# Patient Record
Sex: MALE | Race: WHITE
[De-identification: names, ages, dates, MRNs, and addresses within clinical notes are randomized per-mention and may not be internally consistent; named-entity substitution may affect disease eponyms.]

---

## 2017-05-10 NOTE — ED
General Adult HPI





- General


Chief complaint: Back Pain/Injury


Stated complaint: Pain and numbness foot


Time Seen by Provider: 05/10/17 18:21


Source: patient, RN notes reviewed


Mode of arrival: ambulatory


Limitations: no limitations





- History of Present Illness


Initial comments: 





Patient 56-year-old male who presents emergency room today with chief complaint 

of increased lower back pain.  Patient does admit to pain that radiates down 

the right leg.  He does admit that it's worse when he is up moving around 

better when he sitting down.  Patient does admit to a history of back pain and 

surgery several years ago.  Patient does admit that he's been trying ibuprofen 

at home with little relief of symptoms.  Patient denies any bowel or bladder 

incontinence or retention.  Denies any saddle anesthesia.  He denies any other 

complaints.  Denies any injury or trauma to the area. Patient denies any recent 

fever, chills, shortness of breath, chest pain, abdominal pain, nausea or 

vomiting, dysuria or hematuria, constipation or diarrhea, headaches or visual 

changes, or any other complaints.





- Related Data


 Previous Rx's











 Medication  Instructions  Recorded


 


Diazepam [Valium] 5 mg PO Q8HR #20 tab 09/10/15


 


Ibuprofen [Motrin] 800 mg PO Q8HR #20 tab 09/10/15


 


Cyclobenzaprine [Flexeril] 10 mg PO TID #20 tab 05/10/17


 


Ibuprofen [Motrin] 800 mg PO Q6HR PRN #30 tab 05/10/17











 Allergies











Allergy/AdvReac Type Severity Reaction Status Date / Time


 


No Known Allergies Allergy   Verified 05/10/17 18:19














Review of Systems


ROS Statement: 


Those systems with pertinent positive or pertinent negative responses have been 

documented in the HPI.





ROS Other: All systems not noted in ROS Statement are negative.





Past Medical History


Past Medical History: Coronary Artery Disease (CAD), Diabetes Mellitus, 

Myocardial Infarction (MI)


Additional Past Medical History / Comment(s): back pain, sciatic pain,


History of Any Multi-Drug Resistant Organisms: None Reported


Past Surgical History: Back Surgery, Heart Catheterization With Stent, 

Orthopedic Surgery


Past Psychological History: No Psychological Hx Reported


Smoking Status: Current every day smoker


Past Alcohol Use History: None Reported


Past Drug Use History: None Reported





General Exam





- General Exam Comments


Initial Comments: 





General:  The patient is awake and alert, in no distress, and does not appear 

acutely ill.   


Neck:  The neck is supple, there is no tenderness or JVD.  


Cardiovascular:  There is a regular rate and rhythm. No murmur, rub or gallop 

is appreciated.


Respiratory:  Lungs are clear to auscultation, respirations are non-labored, 

breath sounds are equal.  No wheezes, stridor, rales, or rhonchi.


Musculoskeletal: No appearance of the thoracic, lumbar spine.  No step-offs 

forms appreciated.  Sensations intact.  Pulses equal bilaterally 2+.  Strength 5

/5.  Tender over the right SI joint.


Neurological:  A&O x 3. CN II-XII intact, There are no obvious motor or sensory 

deficits. Coordination appears grossly intact. Speech is normal.


Skin:  Skin is warm and dry and no rashes or lesions are noted. 


Psychiatric:  Normal mood and affect.  


Limitations: no limitations





Course





 Vital Signs











  05/10/17





  18:16


 


Temperature 98.1 F


 


Pulse Rate 71


 


Respiratory 18





Rate 


 


Blood Pressure 137/69


 


O2 Sat by Pulse 98





Oximetry 














Medical Decision Making





- Medical Decision Making





Sutures were discussed with patient about x-rays here in emergency room.  At 

this time he has declined.  He states he will follow-up with his family doctor 

for further evaluation and MRI.  Patient is diabetic will hold steroids.  

Patient advised to continue with anti-inflammatories and to use Tylenol as well 

for the pain.  Patient will also be given muscle relaxant has been made aware 

that it may make him drowsy not to take this while driving or working. 





Disposition


Clinical Impression: 


 Lumbar radiculopathy, acute





Disposition: HOME SELF-CARE


Condition: Good


Instructions:  Acute Low Back Pain (ED)


Additional Instructions: 


Please use medication as discussed.  Please be aware that medication may make 

you drowsy.  Please follow-up with family doctor in the next 2 days of symptoms 

have not improved.  Please return to emergency room if the symptoms increase or 

worsen or for any other concerns.


Prescriptions: 


Cyclobenzaprine [Flexeril] 10 mg PO TID #20 tab


Ibuprofen [Motrin] 800 mg PO Q6HR PRN #30 tab


 PRN Reason: Pain


Time of Disposition: 18:33

## 2017-07-18 NOTE — MR
EXAMINATION TYPE: MR lumbar spine wo/w con

 

DATE OF EXAM: 7/18/2017

 

COMPARISON: CT abdomen and pelvis June 17, 2012.

 

HISTORY: Low back pain and radiculopathy per order. Back pain extending down left leg with burning se
nsation and foot for 3 months per patient.

 

TECHNIQUE: 

Multiplanar, multisequence images of the lumbar spine is performed without and with IV contrast, util
izing 15 mL intravenous MultiHance 

 

FINDINGS: Sagittal images of the lumbar spine show vertebral body heights and alignment to appear sat
isfactory. Multilevel disc desiccation is present. There is redemonstration of advanced disc space na
rrowing with mild to moderate anterior spurring at L5-S1 level. There is heterogeneous increased T1 a
nd T2 signal consistent with Modic type II degenerative change anteriorly at this level. Increased si
gnal posteriorly consistent with annular tear at this level is identified. Posterior disc herniation 
L2-L3 level is seen on sagittal images. The conus medullaris is normal in position and signal ending 
at T12-L1 disc space level. There is additional endplate change anterior superior L2 endplate. There 
is additional mild to moderate multilevel anterior spurring. No suspicious postcontrast enhancement i
s seen.

 

Axial images the T12-L1 level to appear within normal limits.

 

Axial images at L1-L2 level show mild broad disc bulge effacing anterior thecal sac. Bilateral neural
 foramina remain patent.

 

Axial images at L2-L3 level show mild to moderate broad disc bulge with slightly more prominent lobul
ated right paracentral component on axial image 18, there is effacement of the anterior thecal sac. B
ilateral neural foramina remain patent.

 

Axial images at L3-L4 level show broad-based posterior disc protrusion effacing anterior thecal sac a
nd causing mild-to-moderate bilateral anterior inferior neural foraminal narrowing.

 

Axial images at L4-L5 level show mild to moderate facet degenerative changes bilaterally. There is br
oad-based posterior disc protrusion mildly effacing anterior thecal sac. There is mild to moderate le
ft and mild right-sided neural foraminal narrowing at this level identified.

 

Axial images at L5-S1 level show mild to moderate facet degenerative changes bilaterally. There is br
oad-based right paracentral disc protrusion. Spinal canal is preserved. Bilateral neural foramina are
 felt patent.

 

There are dependent gallstones in gallbladder redemonstrated. There is stable 1.6 cm posterior limb l
eft adrenal mass on axial image 26. 

 

There is nonspecific enhancement of left L3 nerve root in the spinal canal.

 

IMPRESSION: Multilevel degenerative changes in the lumbar spine as detailed above.

## 2019-10-12 ENCOUNTER — HOSPITAL ENCOUNTER (INPATIENT)
Dept: HOSPITAL 47 - EC | Age: 59
LOS: 5 days | Discharge: HOME | DRG: 246 | End: 2019-10-17
Attending: HOSPITALIST | Admitting: HOSPITALIST
Payer: COMMERCIAL

## 2019-10-12 VITALS — BODY MASS INDEX: 25.1 KG/M2

## 2019-10-12 DIAGNOSIS — Z79.4: ICD-10-CM

## 2019-10-12 DIAGNOSIS — G47.00: ICD-10-CM

## 2019-10-12 DIAGNOSIS — I25.2: ICD-10-CM

## 2019-10-12 DIAGNOSIS — M54.9: ICD-10-CM

## 2019-10-12 DIAGNOSIS — I21.19: Primary | ICD-10-CM

## 2019-10-12 DIAGNOSIS — E11.65: ICD-10-CM

## 2019-10-12 DIAGNOSIS — G89.29: ICD-10-CM

## 2019-10-12 DIAGNOSIS — E78.5: ICD-10-CM
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
## 2019-10-12 DIAGNOSIS — Z95.5: ICD-10-CM

## 2019-10-12 DIAGNOSIS — Z79.02: ICD-10-CM

## 2019-10-12 DIAGNOSIS — I25.10: ICD-10-CM

## 2019-10-12 DIAGNOSIS — Z79.82: ICD-10-CM

## 2019-10-12 DIAGNOSIS — F17.200: ICD-10-CM

## 2019-10-12 DIAGNOSIS — Z79.899: ICD-10-CM

## 2019-10-12 DIAGNOSIS — I10: ICD-10-CM

## 2019-10-12 DIAGNOSIS — I25.5: ICD-10-CM

## 2019-10-12 PROCEDURE — 83735 ASSAY OF MAGNESIUM: CPT

## 2019-10-12 PROCEDURE — 84478 ASSAY OF TRIGLYCERIDES: CPT

## 2019-10-12 PROCEDURE — 71275 CT ANGIOGRAPHY CHEST: CPT

## 2019-10-12 PROCEDURE — 85730 THROMBOPLASTIN TIME PARTIAL: CPT

## 2019-10-12 PROCEDURE — 85610 PROTHROMBIN TIME: CPT

## 2019-10-12 PROCEDURE — 84484 ASSAY OF TROPONIN QUANT: CPT

## 2019-10-12 PROCEDURE — 85025 COMPLETE CBC W/AUTO DIFF WBC: CPT

## 2019-10-12 PROCEDURE — 96374 THER/PROPH/DIAG INJ IV PUSH: CPT

## 2019-10-12 PROCEDURE — 85347 COAGULATION TIME ACTIVATED: CPT

## 2019-10-12 PROCEDURE — 83718 ASSAY OF LIPOPROTEIN: CPT

## 2019-10-12 PROCEDURE — 93005 ELECTROCARDIOGRAM TRACING: CPT

## 2019-10-12 PROCEDURE — 82465 ASSAY BLD/SERUM CHOLESTEROL: CPT

## 2019-10-12 PROCEDURE — 83721 ASSAY OF BLOOD LIPOPROTEIN: CPT

## 2019-10-12 PROCEDURE — 83036 HEMOGLOBIN GLYCOSYLATED A1C: CPT

## 2019-10-12 PROCEDURE — 80053 COMPREHEN METABOLIC PANEL: CPT

## 2019-10-12 PROCEDURE — 71045 X-RAY EXAM CHEST 1 VIEW: CPT

## 2019-10-12 PROCEDURE — 99291 CRITICAL CARE FIRST HOUR: CPT

## 2019-10-12 PROCEDURE — 36415 COLL VENOUS BLD VENIPUNCTURE: CPT

## 2019-10-12 PROCEDURE — 93458 L HRT ARTERY/VENTRICLE ANGIO: CPT

## 2019-10-12 PROCEDURE — 96376 TX/PRO/DX INJ SAME DRUG ADON: CPT

## 2019-10-12 PROCEDURE — 96365 THER/PROPH/DIAG IV INF INIT: CPT

## 2019-10-12 PROCEDURE — 80048 BASIC METABOLIC PNL TOTAL CA: CPT

## 2019-10-12 PROCEDURE — 93306 TTE W/DOPPLER COMPLETE: CPT

## 2019-10-13 LAB
ALBUMIN SERPL-MCNC: 3.9 G/DL (ref 3.5–5)
ALBUMIN SERPL-MCNC: 4.7 G/DL (ref 3.5–5)
ALP SERPL-CCNC: 105 U/L (ref 38–126)
ALP SERPL-CCNC: 131 U/L (ref 38–126)
ALT SERPL-CCNC: 42 U/L (ref 21–72)
ALT SERPL-CCNC: 62 U/L (ref 21–72)
ANION GAP SERPL CALC-SCNC: 11 MMOL/L
ANION GAP SERPL CALC-SCNC: 8 MMOL/L
APTT BLD: 23.4 SEC (ref 22–30)
AST SERPL-CCNC: 234 U/L (ref 17–59)
AST SERPL-CCNC: 39 U/L (ref 17–59)
BASOPHILS # BLD AUTO: 0 K/UL (ref 0–0.2)
BASOPHILS # BLD AUTO: 0.1 K/UL (ref 0–0.2)
BASOPHILS NFR BLD AUTO: 0 %
BASOPHILS NFR BLD AUTO: 1 %
BUN SERPL-SCNC: 21 MG/DL (ref 9–20)
BUN SERPL-SCNC: 25 MG/DL (ref 9–20)
CALCIUM SPEC-MCNC: 10.1 MG/DL (ref 8.4–10.2)
CALCIUM SPEC-MCNC: 9.4 MG/DL (ref 8.4–10.2)
CHLORIDE SERPL-SCNC: 103 MMOL/L (ref 98–107)
CHLORIDE SERPL-SCNC: 98 MMOL/L (ref 98–107)
CO2 SERPL-SCNC: 27 MMOL/L (ref 22–30)
CO2 SERPL-SCNC: 30 MMOL/L (ref 22–30)
EOSINOPHIL # BLD AUTO: 0.1 K/UL (ref 0–0.7)
EOSINOPHIL # BLD AUTO: 0.1 K/UL (ref 0–0.7)
EOSINOPHIL NFR BLD AUTO: 1 %
EOSINOPHIL NFR BLD AUTO: 1 %
ERYTHROCYTE [DISTWIDTH] IN BLOOD BY AUTOMATED COUNT: 4.62 M/UL (ref 4.3–5.9)
ERYTHROCYTE [DISTWIDTH] IN BLOOD BY AUTOMATED COUNT: 5.14 M/UL (ref 4.3–5.9)
ERYTHROCYTE [DISTWIDTH] IN BLOOD: 12.6 % (ref 11.5–15.5)
ERYTHROCYTE [DISTWIDTH] IN BLOOD: 12.7 % (ref 11.5–15.5)
GLUCOSE BLD-MCNC: 186 MG/DL (ref 75–99)
GLUCOSE BLD-MCNC: 194 MG/DL (ref 75–99)
GLUCOSE BLD-MCNC: 234 MG/DL (ref 75–99)
GLUCOSE BLD-MCNC: 260 MG/DL (ref 75–99)
GLUCOSE BLD-MCNC: 295 MG/DL (ref 75–99)
GLUCOSE SERPL-MCNC: 280 MG/DL (ref 74–99)
GLUCOSE SERPL-MCNC: 285 MG/DL (ref 74–99)
HCT VFR BLD AUTO: 42.9 % (ref 39–53)
HCT VFR BLD AUTO: 47.1 % (ref 39–53)
HGB BLD-MCNC: 14.4 GM/DL (ref 13–17.5)
HGB BLD-MCNC: 16.5 GM/DL (ref 13–17.5)
INR PPP: 0.9 (ref ?–1.2)
LYMPHOCYTES # SPEC AUTO: 3.2 K/UL (ref 1–4.8)
LYMPHOCYTES # SPEC AUTO: 4.2 K/UL (ref 1–4.8)
LYMPHOCYTES NFR SPEC AUTO: 32 %
LYMPHOCYTES NFR SPEC AUTO: 35 %
MAGNESIUM SPEC-SCNC: 2 MG/DL (ref 1.6–2.3)
MCH RBC QN AUTO: 31.3 PG (ref 25–35)
MCH RBC QN AUTO: 32 PG (ref 25–35)
MCHC RBC AUTO-ENTMCNC: 33.7 G/DL (ref 31–37)
MCHC RBC AUTO-ENTMCNC: 34.9 G/DL (ref 31–37)
MCV RBC AUTO: 91.7 FL (ref 80–100)
MCV RBC AUTO: 92.8 FL (ref 80–100)
MONOCYTES # BLD AUTO: 0.6 K/UL (ref 0–1)
MONOCYTES # BLD AUTO: 0.8 K/UL (ref 0–1)
MONOCYTES NFR BLD AUTO: 6 %
MONOCYTES NFR BLD AUTO: 7 %
NEUTROPHILS # BLD AUTO: 5.7 K/UL (ref 1.3–7.7)
NEUTROPHILS # BLD AUTO: 6.6 K/UL (ref 1.3–7.7)
NEUTROPHILS NFR BLD AUTO: 55 %
NEUTROPHILS NFR BLD AUTO: 59 %
PLATELET # BLD AUTO: 179 K/UL (ref 150–450)
PLATELET # BLD AUTO: 189 K/UL (ref 150–450)
POTASSIUM SERPL-SCNC: 4.3 MMOL/L (ref 3.5–5.1)
POTASSIUM SERPL-SCNC: 4.4 MMOL/L (ref 3.5–5.1)
PROT SERPL-MCNC: 6.8 G/DL (ref 6.3–8.2)
PROT SERPL-MCNC: 7.8 G/DL (ref 6.3–8.2)
PT BLD: 9.7 SEC (ref 9–12)
SODIUM SERPL-SCNC: 138 MMOL/L (ref 137–145)
SODIUM SERPL-SCNC: 139 MMOL/L (ref 137–145)
WBC # BLD AUTO: 12 K/UL (ref 3.8–10.6)
WBC # BLD AUTO: 9.8 K/UL (ref 3.8–10.6)

## 2019-10-13 PROCEDURE — 027236Z DILATION OF CORONARY ARTERY, THREE ARTERIES WITH THREE DRUG-ELUTING INTRALUMINAL DEVICES, PERCUTANEOUS APPROACH: ICD-10-PCS

## 2019-10-13 PROCEDURE — 02C03ZZ EXTIRPATION OF MATTER FROM CORONARY ARTERY, ONE ARTERY, PERCUTANEOUS APPROACH: ICD-10-PCS

## 2019-10-13 RX ADMIN — INSULIN ASPART SCH UNIT: 100 INJECTION, SOLUTION INTRAVENOUS; SUBCUTANEOUS at 20:41

## 2019-10-13 RX ADMIN — METOPROLOL TARTRATE SCH MG: 25 TABLET, FILM COATED ORAL at 10:37

## 2019-10-13 RX ADMIN — NITROGLYCERIN ONE MCG: 10 INJECTION INTRAVENOUS at 03:19

## 2019-10-13 RX ADMIN — INSULIN ASPART SCH UNIT: 100 INJECTION, SOLUTION INTRAVENOUS; SUBCUTANEOUS at 12:30

## 2019-10-13 RX ADMIN — LISINOPRIL SCH MG: 2.5 TABLET ORAL at 10:37

## 2019-10-13 RX ADMIN — NITROGLYCERIN ONE MCG: 10 INJECTION INTRAVENOUS at 03:35

## 2019-10-13 RX ADMIN — INSULIN ASPART SCH UNIT: 100 INJECTION, SOLUTION INTRAVENOUS; SUBCUTANEOUS at 16:50

## 2019-10-13 RX ADMIN — INSULIN ASPART SCH UNIT: 100 INJECTION, SOLUTION INTRAVENOUS; SUBCUTANEOUS at 07:31

## 2019-10-13 RX ADMIN — ASPIRIN 325 MG ORAL TABLET SCH MG: 325 PILL ORAL at 10:37

## 2019-10-13 RX ADMIN — METOPROLOL TARTRATE SCH MG: 25 TABLET, FILM COATED ORAL at 20:40

## 2019-10-13 RX ADMIN — CEFAZOLIN SCH: 330 INJECTION, POWDER, FOR SOLUTION INTRAMUSCULAR; INTRAVENOUS at 04:23

## 2019-10-13 RX ADMIN — HEPARIN SODIUM SCH MLS/HR: 10000 INJECTION, SOLUTION INTRAVENOUS at 01:28

## 2019-10-13 RX ADMIN — NITROGLYCERIN ONE MCG: 10 INJECTION INTRAVENOUS at 02:35

## 2019-10-13 RX ADMIN — ATORVASTATIN CALCIUM SCH MG: 80 TABLET, FILM COATED ORAL at 20:40

## 2019-10-13 NOTE — ED
General Adult HPI





- General


Chief complaint: Chest Pain


Stated complaint: Chest pain


Time Seen by Provider: 10/12/19 23:58


Source: EMS


Mode of arrival: EMS


Limitations: no limitations





- History of Present Illness


Initial comments: 





Dictation was produced using dragon dictation software. please excuse any 

grammatical, word or spelling errors. 





Chief Complaint: 59-year-old male presents with acute onset chest and left upper

extremity pain.





History of Present Illness: he is 59-year-old male who has past medical history 

of coronary artery disease, diabetes, myocardial infarction.  He states that he 

was in bed when he developed acute onset chest pain and left upper extremity 

pain.  Patient states that the pain is sharp without any exacerbating or 

mitigating factors.  He states that symptoms began acutely.  He does also 

complain of some numbness to his left hand.  Patient has a history of myocardial

infarction.  He does complain of chest pain.  Describes the pain in his chest as

pressure like sensation.  He does not know if his symptoms are similar to 

previous heart attack symptoms.  Patient was brought in by EMS.  EMS evaluated 

patient and was concerned of acute coronary syndrome.  He is given aspirin and 

nitroglycerin.  Patient states that his symptoms do not improve after 

nitroglycerin administration.  States that the pain is severe.  Brother was also

at bedside states that patient was observed to be acutely ill.  He did fall to 

the ground because the pain was so severe.  Patient states he feels numb to his 

left hand.  He initially felt like his pain was secondary to sleeping on his arm

incorrectly however his pain became worse.





The ROS documented in this emergency department record has been reviewed and 

confirmed by me.  Those systems with pertinent positive or negative responses 

have been documented in the HPI.  All other systems are other negative and/or 

noncontributory.








PHYSICAL EXAM:


General Impression: Alert and oriented x3, acute distress secondary to pain, 

mildly diaphoretic


HEENT: Normocephalic atraumatic, extra-ocular movements intact, pupils equal and

reactive to light bilaterally, dry mucous membranes, no carotid bruit


Cardiovascular: Heart regular rate and rhythm, S1&S2 audible, no murmurs, rubs 

or gallops


Chest: Lungs clear to auscultation bilaterally, no rhonchi, no wheeze, no rales


Abdomen: Bowel sounds present, abdomen soft, non-tender, non-distended, no 

organomegaly


Musculoskeletal: Pulses present and equal in all extremities, no peripheral 

edema.  He has equal pulses in his bilateral upper extremities.  Mild pallor and

coolness to the left hand compared to the right


Motor:  no focal deficits noted


Neurological: CN II-XII grossly intact, numbness to no blood pressure of the 

left hand compared to the right


Skin: Intact with no visualized rashes


Psych: Anxious





ED course: 59-year-old male presents with onset chest pain and left upper 

extremity pain.  Patient was in acute distress holding his left upper extremity.

 All signs upon arrival shows blood pressure 161/99, worse vital signs within 

acceptable limits.  Patient placed on cardiac monitor showing sinus rhythm.  

Given clinical presentation there is concern of acute aortic dissection 

extending into the left upper extremity.  Her bedside ultrasound was performed 

showing no pericardial effusion.  Aortic root was measured using point-of-care 

bedside ultrasound measuring at 3.8 cm.  Attempt was made to identify possible 

arterial flap in the left upper extremity arterial system.  No flap noted to the

left upper arm.  EKG did not suggest acute myocardial infarction.  Multiple 

PVCs.  Patient given 50 mg of fentanyl.


CTA of the chest with runoff into the left upper extremity shows no findings of 

acute aortic dissection with extension into the left upper extremity arterial 

system.  Labs were performed.  Patient had leukocytosis O.0.  Coag panel 

unremarkable.  Patient had a creatinine elevation of 1.48.  Patient has troponin

level is 0.3.  Repeat EKG was performed with dynamic changes.  Repeat EKG was 

consistent with inferior ST segment elevation MI with Versed with changes in the

high lateral leads.  Code STEMI was paged.  Patient was given analgesia.  

Discussed patient case with radiologist who did not see any acute findings in 

the left upper extremity.  discussed patient care with Dr. Nieto was willing to 

take patient to cardiac Cath Lab.  Patient started on heparin.  he'll be admitt

ed to Dr. Tapia's group.





EKG interpretation: Ventricular rate 98, sinus rhythm with multiple PVCs, NJ 

140, , . No NJ prolongation, no QTC prolongation, no ST or T-wave 

changes noted.  





Repeat EKG performed hour later showed inferior STEMI with reciprocal changes in

the high lateral leads.  Ventricular rate 84, when necessary 166, , QTc 

451.








- Related Data


                                  Previous Rx's











 Medication  Instructions  Recorded


 


Diazepam [Valium] 5 mg PO Q8HR #20 tab 09/10/15


 


Ibuprofen [Motrin] 800 mg PO Q8HR #20 tab 09/10/15


 


Cyclobenzaprine [Flexeril] 10 mg PO TID #20 tab 05/10/17


 


Ibuprofen [Motrin] 800 mg PO Q6HR PRN #30 tab 05/10/17











                                    Allergies











Allergy/AdvReac Type Severity Reaction Status Date / Time


 


No Known Allergies Allergy   Verified 10/13/19 00:08














Review of Systems


ROS Statement: 


Those systems with pertinent positive or pertinent negative responses have been 

documented in the HPI.





ROS Other: All systems not noted in ROS Statement are negative.





Past Medical History


Past Medical History: Coronary Artery Disease (CAD), Diabetes Mellitus, 

Myocardial Infarction (MI)


Additional Past Medical History / Comment(s): back pain, sciatic pain,


History of Any Multi-Drug Resistant Organisms: None Reported


Past Surgical History: Back Surgery, Heart Catheterization With Stent, 

Orthopedic Surgery


Past Psychological History: No Psychological Hx Reported


Smoking Status: Current every day smoker


Past Alcohol Use History: None Reported


Past Drug Use History: None Reported





General Exam


Limitations: no limitations





Course


                                   Vital Signs











  10/13/19





  00:00


 


Temperature 98.4 F


 


Pulse Rate 87


 


Respiratory 24





Rate 


 


Blood Pressure 161/99


 


O2 Sat by Pulse 98





Oximetry 














Medical Decision Making





- Lab Data


Result diagrams: 


                                 10/13/19 00:09





                                 10/13/19 00:09


                                   Lab Results











  10/13/19 10/13/19 10/13/19 Range/Units





  00:09 00:09 00:09 


 


WBC  12.0 H    (3.8-10.6)  k/uL


 


RBC  5.14    (4.30-5.90)  m/uL


 


Hgb  16.5    (13.0-17.5)  gm/dL


 


Hct  47.1    (39.0-53.0)  %


 


MCV  91.7    (80.0-100.0)  fL


 


MCH  32.0    (25.0-35.0)  pg


 


MCHC  34.9    (31.0-37.0)  g/dL


 


RDW  12.6    (11.5-15.5)  %


 


Plt Count  189    (150-450)  k/uL


 


Neutrophils %  55    %


 


Lymphocytes %  35    %


 


Monocytes %  7    %


 


Eosinophils %  1    %


 


Basophils %  1    %


 


Neutrophils #  6.6    (1.3-7.7)  k/uL


 


Lymphocytes #  4.2    (1.0-4.8)  k/uL


 


Monocytes #  0.8    (0-1.0)  k/uL


 


Eosinophils #  0.1    (0-0.7)  k/uL


 


Basophils #  0.1    (0-0.2)  k/uL


 


PT    9.7  (9.0-12.0)  sec


 


INR    0.9  (<1.2)  


 


APTT    23.4  (22.0-30.0)  sec


 


Sodium   139   (137-145)  mmol/L


 


Potassium   4.3   (3.5-5.1)  mmol/L


 


Chloride   98   ()  mmol/L


 


Carbon Dioxide   30   (22-30)  mmol/L


 


Anion Gap   11   mmol/L


 


BUN   25 H   (9-20)  mg/dL


 


Creatinine   1.48 H   (0.66-1.25)  mg/dL


 


Est GFR (CKD-EPI)AfAm   60   (>60 ml/min/1.73 sqM)  


 


Est GFR (CKD-EPI)NonAf   52   (>60 ml/min/1.73 sqM)  


 


Glucose   285 H   (74-99)  mg/dL


 


Calcium   10.1   (8.4-10.2)  mg/dL


 


Magnesium   2.0   (1.6-2.3)  mg/dL


 


Total Bilirubin   0.4   (0.2-1.3)  mg/dL


 


AST   39   (17-59)  U/L


 


ALT   42   (21-72)  U/L


 


Alkaline Phosphatase   131 H   ()  U/L


 


Troponin I     (0.000-0.034)  ng/mL


 


Total Protein   7.8   (6.3-8.2)  g/dL


 


Albumin   4.7   (3.5-5.0)  g/dL














  10/13/19 Range/Units





  00:09 


 


WBC   (3.8-10.6)  k/uL


 


RBC   (4.30-5.90)  m/uL


 


Hgb   (13.0-17.5)  gm/dL


 


Hct   (39.0-53.0)  %


 


MCV   (80.0-100.0)  fL


 


MCH   (25.0-35.0)  pg


 


MCHC   (31.0-37.0)  g/dL


 


RDW   (11.5-15.5)  %


 


Plt Count   (150-450)  k/uL


 


Neutrophils %   %


 


Lymphocytes %   %


 


Monocytes %   %


 


Eosinophils %   %


 


Basophils %   %


 


Neutrophils #   (1.3-7.7)  k/uL


 


Lymphocytes #   (1.0-4.8)  k/uL


 


Monocytes #   (0-1.0)  k/uL


 


Eosinophils #   (0-0.7)  k/uL


 


Basophils #   (0-0.2)  k/uL


 


PT   (9.0-12.0)  sec


 


INR   (<1.2)  


 


APTT   (22.0-30.0)  sec


 


Sodium   (137-145)  mmol/L


 


Potassium   (3.5-5.1)  mmol/L


 


Chloride   ()  mmol/L


 


Carbon Dioxide   (22-30)  mmol/L


 


Anion Gap   mmol/L


 


BUN   (9-20)  mg/dL


 


Creatinine   (0.66-1.25)  mg/dL


 


Est GFR (CKD-EPI)AfAm   (>60 ml/min/1.73 sqM)  


 


Est GFR (CKD-EPI)NonAf   (>60 ml/min/1.73 sqM)  


 


Glucose   (74-99)  mg/dL


 


Calcium   (8.4-10.2)  mg/dL


 


Magnesium   (1.6-2.3)  mg/dL


 


Total Bilirubin   (0.2-1.3)  mg/dL


 


AST   (17-59)  U/L


 


ALT   (21-72)  U/L


 


Alkaline Phosphatase   ()  U/L


 


Troponin I  0.300 H*  (0.000-0.034)  ng/mL


 


Total Protein   (6.3-8.2)  g/dL


 


Albumin   (3.5-5.0)  g/dL














Critical Care Time


Critical Care Time: Yes


Total Critical Care Time: 62





Disposition


Clinical Impression: 


 STEMI (ST elevation myocardial infarction)





Disposition: ADMITTED AS IP TO THIS [facility]


Condition: Critical


Referrals: 


None,Stated [Primary Care Provider] - 1-2 days


Decision Time: 01:34

## 2019-10-13 NOTE — CT
EXAMINATION TYPE: CT angio upper extremity LT

 

DATE OF EXAM: 10/13/2019 1:04 AM

 

COMPARISON:

 

HISTORY: dissection

 

CT DLP: 378.4 mGycm

Automated exposure control for dose reduction was used.

 

TECHNIQUE: 

Performed with IV Contrast, patient injected with 80 mL of Isovue 370.

. 

 

FINDINGS: 

Multiple axial sections were obtained from the level of the main pulmonary artery to the tip of the l
eft fingers with intravenous contrast. There are 3-D post processed images.

 

There is normal branching pattern of the great vessels on the aortic arch. There is arterial flow in 
the left subclavian artery which appears widely patent. There is arterial flow in the left axillary a
nd brachial artery. There is arterial flow in the radial and ulnar arteries. There is no evidence of 
hemodynamic stenosis. There is arterial flow in the radial and ulnar arteries at the wrist. There is 
limited visualization of arteries of the hand. This is probably due to the timing. There is no eviden
ce of aortic dissection. There is no evidence of arterial dissection. I see no evidence of hemodynami
c stenosis.

 

IMPRESSION: 

NEGATIVE CT ANGIOGRAM OF THE LEFT ARM. NO EVIDENCE OF ARTERIAL DISSECTION. NO EVIDENCE OF HEMODYNAMIC
 STENOSIS.

## 2019-10-13 NOTE — P.HPIM
History of Present Illness


H&P Date: 10/13/19


Chief Complaint: Chest pain with right shoulder pain


Mr. Cope is a 59-year-old male with past medical history of hypertension, 

diabetes mellitus, myocardial infarction coming into the hospital with a chief 

complaint of left-sided chest pain that has been radiating to the left shoulder.

 The patient states that he has chronic left shoulder pain that is exacerbated 

with exertion for the past 3 years.  Patient was out of the state went to 

[place] and came in last evening.  Then he started to have chest pain, left 

side of the chest 8 out of 10 in intensity radiating to his left shoulder 

associated with mild difficulty in breathing.  The patient denied having any 

dizziness.  When the patient had the chest pain his brother was at his bedside 

and thought that the patient was in severe pain and that prompted them to call 

EMS.  Patient did fall to the ground couple of times because the pain was very 

severe and he also felt that his left hand was numb.





Patient states that he has hypertension and diabetes but has not been taking any

medications for the past 2 years due to insurance issues.  Smoking history is 

positive he smokes 1 pack in 2 days.  Patient also has tingling and numbness in 

bilateral lower extremities which could be due to diabetic neuropathy.  Patient 

also reports having lower extremity swelling on and off and that is reviewed 

with elevation of his feet at night.





In the emergency department initially there was a concern for her tic dissection

so the patient had CT angios of the chest this was negative.  The EKG was 

negative but subsequent EKG was showing ST segment elevation in 203 and aVF 

leads and the code STEMI pager was activated.  So the patient was taken to the 

cath lab by Dr. Nieto.  It revealed acute total occlusion of the mid RCA and 

severe disease involving the distal RCA .  So the patient had thrombectomy and 

stenting of the mid RCA and also the distal RCA.  Later on patient has been 

transferred to the ICU.





Currently the patient is sitting up in the bed, having his dinner.  Patient 

denies having any chest pain.  Patient's vitals have been within normal limits 

and he has been started on aspirin, Effient, statin, ACE inhibitor and beta 

blocker.














Review of Systems


REVIEW OF SYSTEMS:





PSYCH: No anxiety or depression


NEURO:No c/o weakness of the extremties, No facial droop, No speech abnor

malities.


VASCULAR: Peripheral nervous system within the normal limits no edema


HEMATOLOGIC: No history of easy bleeding and bruising .  No recent infections .


RESPIRATORY: No cough, No SOB, No chest discomfort. 


IMMUNE: No infections


INTEGUMENT: no rashes


OPHTHALMOLOGIC: No blurry vision and no eye discharge


: No dysuria or hematuria


CARDIAC: As per HPI


MUSCULOSKELETAL : No Aches or pains in the joints or muscles. 


GI: No abdominal pain, Nausea or vomiting. No constipation or diarrhea. 





All 13 review systems are negative except ones mentioned above





Past Medical History


Past Medical History: Coronary Artery Disease (CAD), Diabetes Mellitus, Myocardi

al Infarction (MI)


Additional Past Medical History / Comment(s): back pain, sciatic pain,


Last Myocardial Infarction Date:: 2019


History of Any Multi-Drug Resistant Organisms: None Reported


Past Surgical History: Back Surgery, Heart Catheterization With Stent, 

Orthopedic Surgery


Additional Past Surgical History / Comment(s): Left shoulder surgery


Past Anesthesia/Blood Transfusion Reactions: No Reported Reaction


Date of Last Stent Placement:: 2014


Past Psychological History: No Psychological Hx Reported


Smoking Status: Current every day smoker


Past Alcohol Use History: None Reported


Past Drug Use History: None Reported





Medications and Allergies


                                Home Medications











 Medication  Instructions  Recorded  Confirmed  Type


 


No Known Home Medications  10/13/19 10/13/19 History








                                    Allergies











Allergy/AdvReac Type Severity Reaction Status Date / Time


 


No Known Allergies Allergy   Verified 10/13/19 08:07














Physical Exam


Vitals: 


                                   Vital Signs











  Temp Pulse Resp BP Pulse Ox


 


 10/13/19 15:00   71  27 H  128/93  97


 


 10/13/19 14:30   71  10 L  130/81  96


 


 10/13/19 14:00   67  13  117/86  98


 


 10/13/19 13:30   65  8 L  120/78  98


 


 10/13/19 13:00   61  15  114/84  99


 


 10/13/19 12:30   67  6 L  130/77  99


 


 10/13/19 12:00  97.5 F L  72  20  129/75  98


 


 10/13/19 11:30   72  19  132/71  96


 


 10/13/19 11:00   72  15  112/77  98


 


 10/13/19 10:30   73  13  123/71  98


 


 10/13/19 10:00   73  20  128/74  96


 


 10/13/19 09:30   80   118/85  99


 


 10/13/19 09:00   70  12  147/89  99


 


 10/13/19 08:30   82  10 L  133/84  100


 


 10/13/19 08:00  97.5 F L  72  12  132/75  99


 


 10/13/19 07:30   67  15  126/75  98


 


 10/13/19 07:00   70  10 L  113/69  97


 


 10/13/19 06:30   75  7 L  124/80  98


 


 10/13/19 06:00   71  6 L  138/88  98


 


 10/13/19 05:30   76  12  138/88  95


 


 10/13/19 05:00   71  11 L   98


 


 10/13/19 04:30  97.8 F  84  9 L   99


 


 10/13/19 01:50   83  18  156/92  100


 


 10/13/19 01:45   89  20  150/91  100


 


 10/13/19 01:40   86  22  138/96  100


 


 10/13/19 01:35   86  20  148/92  100


 


 10/13/19 01:30   90  20  149/102  100


 


 10/13/19 01:00   86  12  120/95  100


 


 10/13/19 00:30   92  29 H  161/99  97


 


 10/13/19 00:02    28 H  


 


 10/13/19 00:00  98.4 F  87  24  161/99  98








                                Intake and Output











 10/13/19 10/13/19 10/13/19





 06:59 14:59 22:59


 


Intake Total 832 850 500


 


Output Total 800 1400 


 


Balance 32 -550 500


 


Intake:   


 


   450 


 


    Sodium Chloride 0.9% 1, 225 450 





    000 ml @ 75 mls/hr IV .   





    [id number] [facility] Rx#:[id number]   


 


  Oral 250 400 500


 


Output:   


 


  Urine 800 1400 


 


Other:   


 


  Voiding Method Urinal Urinal 


 


  Weight 86.183 kg  86.183 kg














GEN. APPEARANCE: alert, in no apparent distress


HEAD EXAM:  atraumatic, normocephalic, normal inspection


EYE EXAM: No pallor.  No icterus


ENT EXAM:  normal exam, mucous membranes moist


NECK EXAM: No JVD.  No thyromegaly


RESPIRATORY EXAM: Decreased air entry bilaterally .  No wheezes or crackles


CARDIOVASCULAR EXAM:  regular rate, normal rhythm, normal heart sounds.  Absent:

systolic murmur, diastolic murmur, rubs, gallop, clicks


GI/ABDOMINAL EXAM: soft, normal bowel sounds.  Absent: distended, tenderness, 

guarding, rebound, rigid


EXTREMITIES EXAM:  No pedal edema


NEUROLOGICAL EXAM:  alert, oriented X3, no focal neurological deficits


PSYCHIATRIC EXAM:  normal affect, normal mood


SKIN EXAM:  warm, dry, intact, normal color.  Absent: rash








Results


CBC & Chem 7: 


                                 10/13/19 04:50





                                 10/13/19 04:50


Labs: 


                  Abnormal Lab Results - Last 24 Hours (Table)











  10/13/19 10/13/19 10/13/19 Range/Units





  00:09 00:09 00:09 


 


WBC  12.0 H    (3.8-10.6)  k/uL


 


APTT     (22.0-30.0)  sec


 


BUN   25 H   (9-20)  mg/dL


 


Creatinine   1.48 H   (0.66-1.25)  mg/dL


 


Glucose   285 H   (74-99)  mg/dL


 


POC Glucose (mg/dL)     (75-99)  mg/dL


 


AST     (17-59)  U/L


 


Alkaline Phosphatase   131 H   ()  U/L


 


Troponin I    0.300 H*  (0.000-0.034)  ng/mL














  10/13/19 10/13/19 10/13/19 Range/Units





  04:10 04:50 04:50 


 


WBC     (3.8-10.6)  k/uL


 


APTT   74.3 H   (22.0-30.0)  sec


 


BUN    21 H  (9-20)  mg/dL


 


Creatinine     (0.66-1.25)  mg/dL


 


Glucose    280 H  (74-99)  mg/dL


 


POC Glucose (mg/dL)  260 H    (75-99)  mg/dL


 


AST    234 H  (17-59)  U/L


 


Alkaline Phosphatase     ()  U/L


 


Troponin I     (0.000-0.034)  ng/mL














  10/13/19 10/13/19 Range/Units





  07:06 11:40 


 


WBC    (3.8-10.6)  k/uL


 


APTT    (22.0-30.0)  sec


 


BUN    (9-20)  mg/dL


 


Creatinine    (0.66-1.25)  mg/dL


 


Glucose    (74-99)  mg/dL


 


POC Glucose (mg/dL)  234 H  295 H  (75-99)  mg/dL


 


AST    (17-59)  U/L


 


Alkaline Phosphatase    ()  U/L


 


Troponin I    (0.000-0.034)  ng/mL














Thrombosis Risk Factor Assmnt





- Choose All That Apply


Any of the Below Risk Factors Present?: Yes


Each Factor Represents 1 point: Acute MI, Age 41-60 years


Other Risk Factors: Yes


Each Risk Factor Represents 2 Points: Patient confined to bed


Other congenital or acquired thrombophilia - If yes, enter type in comment: No


Thrombosis Risk Factor Assessment Total Risk Factor Score: 4


Thrombosis Risk Factor Assessment Level: Moderate Risk





Assessment and Plan


Assessment: 





ASSESSMENT








ST elevation MI


Type 2 diabetes mellitus poorly controlled


Hypertension poorly controlled


Chronic nicotine dependence


History of myocardial infarction








PLAN: Patient had stenting of his RCA and has been started on dual antiplatelet 

therapy, beta blocker, ACE inhibitor, statin.  As the patient's blood sugars 

have been running high he has been started on insulin 70/30 and will titrate 

depending upon the blood sugars.  Continue with the rest of his medication 

regimen.  Hemoglobin A1c and lipid panel pending currently.  Patient is 

extensively educated on smoking cessation.  Will have  on board to 

help him to get insurance issues.  Further recommendations to follow depending 

on the progress of the patient.

## 2019-10-13 NOTE — P.CRDCN
History of Present Illness


Consult date: 10/13/19


Chief complaint: Chest pain


History of present illness: 





This is a pleasant 59-year-old gentleman with a past medical history significant

for coronary artery disease and prior stenting of the RCA which was performed at

[place] several years ago, currently the patient does not follow-up with any 

cardiologist, and unfortunately continues to smoke, presented to the emergency 

room complaining of chest discomfort.  He was in his usual state of health until

yesterday evening when he was at home and started experiencing discomfort in the

mid of the chest, as a squeezing sensation, with radiation to the left arm, it 

was associated with sweating as well as dizziness.  He presented to the 

emergency room here [facility] where he initially there was a concern 

in the ER for dissection.  Because of that the CTA was performed and did not sh

ow any dissection.  A subsequent EKG was performed in the emergency room and 

that revealed acute inferior ST patient myocardial infarction.  Because of that 

the cath lab was called.  The patient underwent a heart catheterization which 

revealed acute total occlusion of the mid RCA with severe disease involving the 

distal RCA as well as occluded PLV branch of the RCA.  The lesions in the RCA 

were very complex.  I did perform aspiration thrombectomy with the extraction of

large white clot from the right coronary artery with subsequent stenting of the 

mid RCA as well as distal RCA as well as PLV branch of the RCA with a good 

angiographic results by the end and reduction of stenosis or 100% to 0%.  By the

end of the procedure, the patient was chest pain-free.  The patient is going to 

be admitted to the intensive care unit.  He will be started on dual antiplatelet

therapy along with anti-ischemic medication along with high intensity statin.  

We'll obtain an echocardiogram was Doppler.  We'll continue following up with 

him.  Also fasting lipid profile will be performed.





Past Medical History


Past Medical History: Coronary Artery Disease (CAD), Diabetes Mellitus, 

Myocardial Infarction (MI)


Additional Past Medical History / Comment(s): back pain, sciatic pain,


History of Any Multi-Drug Resistant Organisms: None Reported


Past Surgical History: Back Surgery, Heart Catheterization With Stent, 

Orthopedic Surgery


Past Psychological History: No Psychological Hx Reported


Smoking Status: Current every day smoker


Past Alcohol Use History: None Reported


Past Drug Use History: None Reported





Medications and Allergies


                                Home Medications











 Medication  Instructions  Recorded  Confirmed  Type


 


Diazepam [Valium] 5 mg PO Q8HR #20 tab 09/10/15  Rx


 


Ibuprofen [Motrin] 800 mg PO Q8HR #20 tab 09/10/15  Rx


 


Cyclobenzaprine [Flexeril] 10 mg PO TID #20 tab 05/10/17  Rx


 


Ibuprofen [Motrin] 800 mg PO Q6HR PRN #30 tab 05/10/17  Rx








                                    Allergies











Allergy/AdvReac Type Severity Reaction Status Date / Time


 


No Known Allergies Allergy   Verified 10/13/19 00:08














Physical Exam


Vitals: 


                                   Vital Signs











  Temp Pulse Resp BP Pulse Ox


 


 10/13/19 01:50   83  18  156/92  100


 


 10/13/19 01:45   89  20  150/91  100


 


 10/13/19 01:40   86  22  138/96  100


 


 10/13/19 01:35   86  20  148/92  100


 


 10/13/19 01:30   90  20  149/102  100


 


 10/13/19 00:00  98.4 F  87  24  161/99  98








                                Intake and Output











 10/12/19 10/12/19 10/13/19





 14:59 22:59 06:59


 


Intake Total   357


 


Balance   357


 


Intake:   


 


  IV   357


 


Other:   


 


  Weight   86.183 kg














- Constitutional


General appearance: no acute distress





- Respiratory


Respiratory: bilateral: CTA





- Cardiovascular


Rhythm: regular


Heart sounds: normal: S1, S2





Results





                                 10/13/19 00:09





                                 10/13/19 00:09


                                 Cardiac Enzymes











  10/13/19 10/13/19 Range/Units





  00:09 00:09 


 


AST  39   (17-59)  U/L


 


Troponin I   0.300 H*  (0.000-0.034)  ng/mL








                                   Coagulation











  10/13/19 Range/Units





  00:09 


 


PT  9.7  (9.0-12.0)  sec


 


APTT  23.4  (22.0-30.0)  sec








                                       CBC











  10/13/19 Range/Units





  00:09 


 


WBC  12.0 H  (3.8-10.6)  k/uL


 


RBC  5.14  (4.30-5.90)  m/uL


 


Hgb  16.5  (13.0-17.5)  gm/dL


 


Hct  47.1  (39.0-53.0)  %


 


Plt Count  189  (150-450)  k/uL








                          Comprehensive Metabolic Panel











  10/13/19 Range/Units





  00:09 


 


Sodium  139  (137-145)  mmol/L


 


Potassium  4.3  (3.5-5.1)  mmol/L


 


Chloride  98  ()  mmol/L


 


Carbon Dioxide  30  (22-30)  mmol/L


 


BUN  25 H  (9-20)  mg/dL


 


Creatinine  1.48 H  (0.66-1.25)  mg/dL


 


Glucose  285 H  (74-99)  mg/dL


 


Calcium  10.1  (8.4-10.2)  mg/dL


 


AST  39  (17-59)  U/L


 


ALT  42  (21-72)  U/L


 


Alkaline Phosphatase  131 H  ()  U/L


 


Total Protein  7.8  (6.3-8.2)  g/dL


 


Albumin  4.7  (3.5-5.0)  g/dL








                               Current Medications











Generic Name Dose Route Start Last Admin





  Trade Name Freq  PRN Reason Stop Dose Admin


 


Heparin Sodium (Porcine)  0 unit  10/13/19 01:19 





  Heparin  IV  





  PER PROTOCOL PRN  





  Low PTT  





  Protocol  


 


Heparin Sodium/Sodium Chloride  250 mls @ 9.48 mls/hr  10/13/19 01:30  10/13/19 

01:28





  25,000 unit/ Sodium Chloride  IV   11 units/kg/hr





  .Q24H ROGER   9.48 mls/hr





    Administration





  Protocol  





  11 UNITS/KG/HR  


 


Sodium Chloride  1,000 mls @ 20 mls/hr  10/13/19 01:45 





  Saline 0.9%  IV  





  .Q24H ROGER  


 


Naloxone HCl  0.2 mg  10/13/19 01:34 





  Narcan  IV  





  Q2M PRN  





  Opioid Reversal  








                                Intake and Output











 10/12/19 10/12/19 10/13/19





 14:59 22:59 06:59


 


Intake Total   357


 


Balance   357


 


Intake:   


 


  IV   357


 


Other:   


 


  Weight   86.183 kg








                                 Patient Weight











 10/13/19





 06:59


 


Weight 86.183 kg








                                        





                                 10/13/19 00:09 





                                 10/13/19 00:09 











Assessment and Plan


Assessment: 





Assessment


#1 acute inferior ST patient myocardial infarction


#2 coronary artery disease and prior stenting of the RCA


#3 significant history of smoking


#4 hypertension


#5 dyslipidemia





Plan


#1 the patient underwent successful stenting of the RCA in the mid and distal 

portion as well as stenting of the PLV branch of the RCA


#2 continue dual antiplatelet therapy along with high intensity statin along 

with anti-ischemic medication


#3 obtain an echocardiogram to establish LV function


#4 fasting lipid profile


#5 smoking cessation was discussed with him


#6 follow-up with the patient





Thank you for allowing us participate in his care and we will continue following

 up with the patient

## 2019-10-13 NOTE — XR
EXAMINATION TYPE: XR chest 1V portable

 

DATE OF EXAM: 10/13/2019

 

COMPARISON: 4/17/2014

 

HISTORY: Chest pain

 

TECHNIQUE: Single frontal view of the chest is obtained.

 

FINDINGS:  There is no heart failure nor confluent pneumonic infiltrate. Costophrenic angles are chris
r. There are chest leads. Bony thorax is intact.

 

IMPRESSION:  No active cardiopulmonary disease. No change.

## 2019-10-13 NOTE — CT
EXAMINATION TYPE: CT angio chest

 

DATE OF EXAM: 10/13/2019 1:04 AM

 

COMPARISON: None

 

HISTORY: r/o dissection

 

CT DLP: 606.8 mGycm

Automated exposure control for dose reduction was used.

 

CONTRAST: 

CTA scan of the thorax is performed with IV Contrast, patient injected with 80 mL of Isovue 370, pulm
onary embolism protocol. .  

 

FINDINGS:

 

There are 3-D post processed images.

 

There is no mediastinal adenopathy. Thoracic aorta is atheromatous. There is no thoracic aortic aneur
ysm or dissection. Ascending aorta measures 3.8 cm. There are no hilar masses.

 

There is normal contrast opacification of the pulmonary arteries. There are no filling defects.

 

The lungs are clear of infiltrate. There is no evidence of a pulmonary mass. There is subsegmental at
electasis at the posterior lung bases. There is no pleural effusion. There is no pericardial effusion
. Heart size is fairly normal. There is probably small calcified gallstones.

The bony thorax is intact. There is no compression fracture.

IMPRESSION: 

NO EVIDENCE OF PULMONARY EMBOLISM. MILD SUBSEGMENTAL ATELECTASIS AT THE LUNG BASES.

## 2019-10-13 NOTE — CC
CARDIAC CATHETERIZATION REPORT



DATE OF SERVICE:

October 13, 2019



PERFORMING PHYSICIAN:

Juanjose Powell MD, interventional cardiologist.



PROCEDURE PERFORMED:

1. Selective right and left coronary angiogram.

2. Aspiration thrombectomy from the right coronary artery.

3. Successful stenting of the mid RCA using 4.0 x 23 mm Xience HAI with an excellent

    angiographic results and reduction of stenosis from 100% to 0%.

4. Successful stenting of the distal right coronary artery using 3.0 x 18 mm Xience

    HAI with an excellent angiographic results and reduction of stenosis from 80% to

    0%.

5. Successful stenting of the PLV branch of the RCA with excellent angiographic

    results as well.

6. Left heart catheterization.



INDICATIONS:

This is a 59-year-old gentleman with history of coronary artery disease and prior

stenting of the RCA which was performed at [place], unfortunately, the patient is not

following with any cardiologist for the last several years, and unfortunately, he

continues to smoke, presented to the emergency room with chest discomfort.  Initially,

there was a concern about aortic dissection and because of that, he underwent a CTA of

the chest, which showed no dissection.  Subsequent EKG in the ER showed finding

concerning for acute inferior ST-elevation myocardial infarction and because of that, a

heart catheterization emergently was advised and the cath lab staff was called.



APPROACH:

Right common femoral artery.



COMPLICATION:

None.



LEVEL OF SEDATION:

Moderate with sedation length of 96 minutes.



Door to balloon was 2 hours and 26 minutes.



Please note that the patient underwent a CTA of the chest before the heart

catheterization.



PROCEDURE DESCRIPTION:

After obtaining an informed consent, the patient was brought to the cardiac cath lab.

The right common femoral artery was cannulated using micropuncture technique, the

micropuncture wire passed easily.  Then I placed a 6-Swiss sheath 11 cm in the right

common femoral artery.



At that point, I did selective right and left coronary angiogram with JR4 and JL4

catheters.  Subsequently, I did intervene on the right coronary artery.  Please see a

separate paragraph for that.  After that I did leave heart catheterization.



The procedure was completed without any complication.



SELECTIVE CORONARY ANGIOGRAM:

1. The right coronary artery is 100% occluded in the midportion where previous stent

    was placed.

2. The left main is a large caliber vessel with mild disease only.  It bifurcates into

    LCX and LAD.

3. The LCX is a medium caliber vessel.  It is a nondominant vessel.  The proximal circ

    appeared to be angiographically normal.  The mid circumflex has mild disease only.

    The distal circ appeared to be angiographically normal.

4. The LAD: The proximal LAD appeared to have mild disease only.  The mid LAD gives

    rise into a large diagonal branch which has a tight lesion appeared to be in the

    range of 80%.  After that, the LAD is subtotally occluded.

5.



PCI OF THE RCA:

Anticoagulation was initiated using Angiomax.  I crossed the acute total occlusion in

the mid right coronary artery using 0.14 Whisper wire with the backup support of 2.5 x

12 mm balloon.  After that, I did balloon angioplasty using a 2.5 mm balloon which was

placed in the mid RCA under fluoroscopy guidance and inflated under 14 atmospheres for

20 seconds.  The following angiogram showed flow in the mid RCA, but there was no flow

in the distal right coronary artery.  The clot was suspected and because of that, I did

perform aspiration thrombectomy using export catheter with extraction of significant

amount of white thrombus from the right coronary artery.  After that, I did place stent

in the mid RCA.  I stented the RCA using 4.0 x 23 mm Xience drug-eluting stent.  The

stent was positioned under fluoroscopy guidance and deployed under 18 atmospheres for

20 seconds.  I did post dilated the stent using 3.5 x 18 mm NC balloon which was

inflated under 18 atmospheres for 20 seconds.  The following angiogram showed good

angiographic results in the mid RCA.  The distal RCA just before the bifurcation into

PDA and PLV branches was found to have a very tight lesion about 80-90 percent.  I

ballooned the lesion using 3.0 mm balloon before I deployed a 3.0 x 18 mm another

Xience drug-eluting stent where the stent again was positioned under fluoroscopy

guidance and deployed under its nominal pressure.  The following angiogram showed good

angiographic results.  The PLV branch of the right coronary artery has a hazy lesion

appeared to be quite concerning.  I decided to stent that lesion.  I did wire the PLV

branch using a run-through wire.  I did do balloon angioplasty using 2.5 x 12 mm

balloon.  After that I did stent the PLV branch using 2.0 x 23 mm Bk drug-eluting

stent.  The stent was positioned under fluoroscopy guidance and deployed under 18

atmospheres for 20 seconds.  The following angiogram showed an area distal to the

stent, likely to be thrombus.  I decided to leave that alone.  The procedure was

completed without any complication.



CONCLUSION:

1. Acute inferior ST-elevation myocardial infarction in this gentleman with prior

    history of RCA stenting, who continues to smoke.

2. Successful stenting of the mid RCA, distal RCA, and the PLV branch of the RCA.

3. Subtotally occluded mid LAD.



POSTPROCEDURE MANAGEMENT:

1. Dual anti-platelet therapy.

2. Aggressive cholesterol control.

3. Risk factors modifications.

4. An echocardiogram to establish LV function.

5. I discussed with the patient the revascularization of the left anterior descending

    artery.





MMODL / IJN: [id number] / Job#: [id number]

## 2019-10-14 LAB
ANION GAP SERPL CALC-SCNC: 6 MMOL/L
BASOPHILS # BLD AUTO: 0 K/UL (ref 0–0.2)
BASOPHILS NFR BLD AUTO: 0 %
BUN SERPL-SCNC: 13 MG/DL (ref 9–20)
CALCIUM SPEC-MCNC: 9.1 MG/DL (ref 8.4–10.2)
CHLORIDE SERPL-SCNC: 105 MMOL/L (ref 98–107)
CHOLEST SERPL-MCNC: 156 MG/DL (ref ?–200)
CO2 SERPL-SCNC: 27 MMOL/L (ref 22–30)
EOSINOPHIL # BLD AUTO: 0.1 K/UL (ref 0–0.7)
EOSINOPHIL NFR BLD AUTO: 1 %
ERYTHROCYTE [DISTWIDTH] IN BLOOD BY AUTOMATED COUNT: 4.54 M/UL (ref 4.3–5.9)
ERYTHROCYTE [DISTWIDTH] IN BLOOD: 12.6 % (ref 11.5–15.5)
GLUCOSE BLD-MCNC: 142 MG/DL (ref 75–99)
GLUCOSE BLD-MCNC: 154 MG/DL (ref 75–99)
GLUCOSE BLD-MCNC: 176 MG/DL (ref 75–99)
GLUCOSE BLD-MCNC: 216 MG/DL (ref 75–99)
GLUCOSE SERPL-MCNC: 139 MG/DL (ref 74–99)
HBA1C MFR BLD: 9.4 % (ref 4–6)
HCT VFR BLD AUTO: 42 % (ref 39–53)
HDLC SERPL-MCNC: 30 MG/DL (ref 40–60)
HGB BLD-MCNC: 14.5 GM/DL (ref 13–17.5)
LYMPHOCYTES # SPEC AUTO: 3 K/UL (ref 1–4.8)
LYMPHOCYTES NFR SPEC AUTO: 30 %
MCH RBC QN AUTO: 31.9 PG (ref 25–35)
MCHC RBC AUTO-ENTMCNC: 34.4 G/DL (ref 31–37)
MCV RBC AUTO: 92.5 FL (ref 80–100)
MONOCYTES # BLD AUTO: 0.6 K/UL (ref 0–1)
MONOCYTES NFR BLD AUTO: 6 %
NEUTROPHILS # BLD AUTO: 6.1 K/UL (ref 1.3–7.7)
NEUTROPHILS NFR BLD AUTO: 61 %
PLATELET # BLD AUTO: 161 K/UL (ref 150–450)
POTASSIUM SERPL-SCNC: 4.1 MMOL/L (ref 3.5–5.1)
SODIUM SERPL-SCNC: 138 MMOL/L (ref 137–145)
WBC # BLD AUTO: 10 K/UL (ref 3.8–10.6)

## 2019-10-14 RX ADMIN — INSULIN ASPART SCH UNIT: 100 INJECTION, SOLUTION INTRAVENOUS; SUBCUTANEOUS at 11:56

## 2019-10-14 RX ADMIN — INSULIN ASPART SCH UNIT: 100 INJECTION, SUSPENSION SUBCUTANEOUS at 06:49

## 2019-10-14 RX ADMIN — LISINOPRIL SCH MG: 2.5 TABLET ORAL at 08:53

## 2019-10-14 RX ADMIN — INSULIN ASPART SCH UNIT: 100 INJECTION, SOLUTION INTRAVENOUS; SUBCUTANEOUS at 17:16

## 2019-10-14 RX ADMIN — NICOTINE SCH PATCH: 14 PATCH, EXTENDED RELEASE TRANSDERMAL at 21:32

## 2019-10-14 RX ADMIN — INSULIN ASPART SCH UNIT: 100 INJECTION, SUSPENSION SUBCUTANEOUS at 17:21

## 2019-10-14 RX ADMIN — PRASUGREL SCH: 10 TABLET, FILM COATED ORAL at 10:55

## 2019-10-14 RX ADMIN — METOPROLOL TARTRATE SCH MG: 25 TABLET, FILM COATED ORAL at 08:53

## 2019-10-14 RX ADMIN — METOPROLOL TARTRATE SCH MG: 25 TABLET, FILM COATED ORAL at 20:31

## 2019-10-14 RX ADMIN — PRASUGREL SCH MG: 10 TABLET, FILM COATED ORAL at 04:25

## 2019-10-14 RX ADMIN — HEPARIN SODIUM SCH: 10000 INJECTION, SOLUTION INTRAVENOUS at 02:44

## 2019-10-14 RX ADMIN — ATORVASTATIN CALCIUM SCH MG: 80 TABLET, FILM COATED ORAL at 20:31

## 2019-10-14 RX ADMIN — ASPIRIN 325 MG ORAL TABLET SCH MG: 325 PILL ORAL at 08:53

## 2019-10-14 RX ADMIN — INSULIN ASPART SCH UNIT: 100 INJECTION, SOLUTION INTRAVENOUS; SUBCUTANEOUS at 06:49

## 2019-10-14 RX ADMIN — CEFAZOLIN SCH: 330 INJECTION, POWDER, FOR SOLUTION INTRAMUSCULAR; INTRAVENOUS at 02:44

## 2019-10-14 RX ADMIN — INSULIN ASPART SCH UNIT: 100 INJECTION, SOLUTION INTRAVENOUS; SUBCUTANEOUS at 20:31

## 2019-10-14 NOTE — P.PN
Subjective


Progress Note Date: 10/14/19


Principal diagnosis: 


ST elevation MI





janeth Cope is a 59-year-old male with past medical history of hypertension, 

diabetes mellitus, myocardial infarction coming into the hospital with a chief 

complaint of left-sided chest pain that has been radiating to the left shoulder.




In the emergency department initially there was a concern for her tic dissection

so the patient had CT angios of the chest this was negative.  The EKG was 

negative but subsequent EKG was showing ST segment elevation in 203 and aVF 

leads and the code STEMI pager was activated.  So the patient was taken to the 

cath lab by Dr. Nieto.  It revealed acute total occlusion of the mid RCA and 

severe disease involving the distal RCA .  So the patient had thrombectomy and 

stenting of the mid RCA and also the distal RCA.  Later on patient has been 

transferred to the ICU.





On 10/14/2019 - patient is sitting up in a chair by the bedside.  He denies 

having any chest pain or left shoulder pain.  No cough or difficulty in 

breathing.  He denies any swelling in the right groin, various access for 

cardiac cath was done.  No abdominal pain nausea vomiting or diarrhea.  No 

dysuria or hematuria.  Patient's blood sugars have been running between 200 to 

150s.  He is on insulin 70 x 30 - 5 units twice a day.  Patient's hemoglobin has

been stable.





Active Medications





Al Hydroxide/Mg Hydroxide (Maalox)  30 ml PO Q4HR PRN


   PRN Reason: Heartburn


Aspirin (Aspirin)  325 mg PO DAILY [facility]


   Last Admin: 10/14/19 08:53 Dose:  325 mg


   Documented by: 


Atorvastatin Calcium (Lipitor)  80 mg PO HS [facility]


   Last Admin: 10/13/19 20:40 Dose:  80 mg


   Documented by: 


Atropine Sulfate (Atropine)  0.5 mg IV ONCE PRN


   PRN Reason: Symptomatic Bradycardia


Heparin Sodium (Porcine) (Heparin)  0 unit IV PER PROTOCOL PRN; Protocol


   PRN Reason: Low PTT


Heparin Sodium/Sodium Chloride (25,000 unit/ Sodium Chloride)  250 mls @ 9.48 

mls/hr IV .Q24H [facility]; Protocol


   Last Admin: 10/14/19 02:44 Dose:  Not Given


   Documented by: 


Sodium Chloride (Saline 0.9%)  1,000 mls @ 20 mls/hr IV .Q24H [facility]


   Last Admin: 10/14/19 02:44 Dose:  Not Given


   Documented by: 


Insulin Aspart (Novolog)  0 unit SQ ACHS [facility]; Protocol


   Last Admin: 10/14/19 11:56 Dose:  1 unit


   Documented by: 


Insulin Aspart (Novolog Mix 70-30 Vial)  5 unit SQ AC-BID [facility]


   Last Admin: 10/14/19 06:49 Dose:  5 unit


   Documented by: 


Lisinopril (Zestril)  2.5 mg PO DAILY [facility]


   Last Admin: 10/14/19 08:53 Dose:  2.5 mg


   Documented by: 


Metoprolol Tartrate (Lopressor)  12.5 mg PO BID [facility]


   Last Admin: 10/14/19 08:53 Dose:  12.5 mg


   Documented by: 


Miscellaneous Information (Rx Info: Iv Contrast Was Given)  1 each MISCELLANE 

DAILY PRN


   PRN Reason: Per Protocol


   Stop: 10/15/19 03:53


Naloxone HCl (Narcan)  0.2 mg IV Q2M PRN


   PRN Reason: Opioid Reversal


Nitroglycerin (Nitrostat)  0.4 mg SUBLINGUAL Q5M PRN


   PRN Reason: Chest Pain


Prasugrel (Effient)  10 mg PO DAILY [facility]


   Last Admin: 10/14/19 10:55 Dose:  Not Given


   Documented by: 


Zolpidem Tartrate (Ambien)  5 mg PO HS PRN


   PRN Reason: Insomnia

















Objective





- Vital Signs


Vital signs: 


                                   Vital Signs











Temp  98.0 F   10/14/19 04:00


 


Pulse  65   10/14/19 11:00


 


Resp  19   10/14/19 11:00


 


BP  121/72   10/14/19 11:00


 


Pulse Ox  95   10/14/19 11:00








                                 Intake & Output











 10/13/19 10/14/19 10/14/19





 18:59 06:59 18:59


 


Intake Total 1950 350 0


 


Output Total 1800 1400 500


 


Balance 150 -1050 -500


 


Weight 86.183 kg 87.4 kg 


 


Intake:   


 


    


 


    Sodium Chloride 0.9% 1, 450  





    000 ml @ 75 mls/hr IV .   





    [id number] [facility] Rx#:[id number]   


 


  Intake, IV Titration   0





  Amount   


 


    Sodium Chloride 0.9% 1,   0





    000 ml @ 75 mls/hr IV .   





    [id number] [facility] Rx#:[id number]   


 


  Oral 1500 350 


 


Output:   


 


  Urine 1800 1400 500


 


Other:   


 


  Voiding Method Urinal Urinal Urinal








                       ABP, PAP, CO, CI - Last Documented











Arterial Blood Pressure        137/77

















- Exam


GEN. APPEARANCE: alert, in no apparent distress


HEAD EXAM:  atraumatic, normocephalic, normal inspection


EYE EXAM: No pallor.  No icterus


ENT EXAM:  normal exam, mucous membranes moist


NECK EXAM: No JVD.  No thyromegaly


RESPIRATORY EXAM: Decreased air entry bilaterally .  No wheezes or crackles


CARDIOVASCULAR EXAM: S1-S2 heard.  No additional sounds.


GI/ABDOMINAL EXAM: soft, normal bowel sounds.  No guarding or rigidity.  Right 

groin looks clean.  No hematoma.


EXTREMITIES EXAM:  No pedal edema


NEUROLOGICAL EXAM:  alert, oriented X3, no focal neurological deficits


PSYCHIATRIC EXAM:  normal affect, normal mood


SKIN EXAM:  warm, dry, intact, normal color.  Absent: rash








- Labs


CBC & Chem 7: 


                                 10/14/19 04:02





                                 10/14/19 04:05


Labs: 


                  Abnormal Lab Results - Last 24 Hours (Table)











  10/13/19 10/13/19 10/14/19 Range/Units





  16:29 20:09 04:05 


 


Glucose    139 H  (74-99)  mg/dL


 


POC Glucose (mg/dL)  194 H  186 H   (75-99)  mg/dL


 


HDL Cholesterol     (40-60)  mg/dL














  10/14/19 10/14/19 10/14/19 Range/Units





  04:05 06:37 11:27 


 


Glucose     (74-99)  mg/dL


 


POC Glucose (mg/dL)   142 H  154 H  (75-99)  mg/dL


 


HDL Cholesterol  30 L    (40-60)  mg/dL














Assessment and Plan


Assessment: 





ASSESSMENT








ST elevation MI


Type 2 diabetes mellitus poorly controlled


Hypertension poorly controlled


Chronic nicotine dependence


History of myocardial infarction








PLAN: Patient had stenting of his RCA and has been started on dual antiplatelet 

therapy, beta blocker, ACE inhibitor, statin.  He has been started on insulin 

70/ 30 - 5 units twice a day and his blood sugars have been running between 150s

to 200s.  Continue with the rest of his medication regimen.  Hemoglobin A1c and 

lipid panel pending currently.  Patient is extensively educated on smoking 

cessation again.  Will have  on board to help him to get insurance 

issues.  Further recommendations to follow depending on the progress of the 

patient.

## 2019-10-14 NOTE — ECHOF
Referral Reason:stemi



MEASUREMENTS

--------

HEIGHT: 180.3 cm

WEIGHT: 87.1 kg

BP: 

RVIDd:   1.7 cm     (< 3.3)

IVSd:   1.3 cm     (0.6 - 1.1)

LVIDd:   5.0 cm     (3.9 - 5.3)

LVPWd:   1.2 cm     (0.6 - 1.1)

IVSs:   1.4 cm

LVIDs:   4.3 cm

LVPWs:   1.6 cm

EF(Teich):   33 %

%FS:   16 %

IVSd:   0.7 cm     (0.6 - 1.1)

LVIDd:   6.2 cm     (3.9 - 5.3)

LVPWd:   1.3 cm     (0.6 - 1.1)

IVSs:   0.8 cm

LVIDs:   5.2 cm

LVPWs:   1.3 cm

EDV(Teich):   197 ml

ESV(Teich):   132 ml

EF(Teich):   33 %

%FS:   16 %

SV(Teich):   65 ml

Ao Diam:   3.6 cm     (2.0 - 3.7)

AV Cusp:   1.6 cm     (1.5 - 2.6)

LA Diam:   2.7 cm     (2.7 - 3.8)

MV EXCURSION:   17.354 mm     (> 18.000)

MV EF SLOPE:   89 mm/s     (70 - 150)

EPSS:   1.1 cm

MV E Hunter:   0.63 m/s

MV DecT:   214 ms

MV A Hunter:   0.59 m/s

MV E/A Ratio:   1.08 

RAP:   5.00 mmHg

RVSP:   11.24 mmHg

TAPSE:   23.08 mm







FINDINGS

--------

Sinus rhythm.

This was a technically difficult study with suboptimal views.

The left ventricular size is normal.   There is mild concentric left ventricular hypertrophy.   Overa
ll left ventricular systolic function is moderate-severely impaired with, an EF between 30 - 35 %.

The right ventricle is normal in size.   The right ventricular systolic function is normal.

The left atrial size is normal.

The right atrial size is normal.

Lumason used

The aortic valve is trileaflet and appears structurally normal.

The mitral valve is normal.   The mitral valve leaflets are mildly thickened.   Mild mitral annular c
alcification present.   There is trace mitral regurgitation.

The tricuspid valve appears structurally normal.   Trace tricuspid regurgitation present.   Right carmelita
tricular systolic pressure is normal at < 35 mmHg.

There is no pulmonic regurgitation present.

The aortic root size is normal.

IVC Not well visulized.

There is no pericardial effusion.



CONCLUSIONS

--------

1. Sinus rhythm.

2. This was a technically difficult study with suboptimal views.

3. The left ventricular size is normal.

4. There is mild concentric left ventricular hypertrophy.

5. Overall left ventricular systolic function is moderate-severely impaired with, an EF between 30 - 
35 %.

6. The right ventricle is normal in size.

7. The right ventricular systolic function is normal.

8. The left atrial size is normal.

9. The right atrial size is normal.

10. Lumason used

11. The aortic valve is trileaflet and appears structurally normal.

12. The mitral valve is normal.

13. The mitral valve leaflets are mildly thickened.

14. Mild mitral annular calcification present.

15. There is trace mitral regurgitation.

16. The tricuspid valve appears structurally normal.

17. Trace tricuspid regurgitation present.

18. Right ventricular systolic pressure is normal at < 35 mmHg.

19. There is no pulmonic regurgitation present.

20. The aortic root size is normal.

21. IVC Not well visulized.

22. There is no pericardial effusion.





SONOGRAPHER: Tonya Rapp RDCS

## 2019-10-14 NOTE — P.PN
Subjective





Patient is doing well.  No chest discomfort dizziness lightheadedness or 

palpitations


Resting comfortably in bed


No respiratory distress





Vitals are stable blood pressure 121/72 mmHg respirations 12-14 pulse rate in 

the 60s


Afebrile


Breath sounds are clear no rhonchi no crackles


Heart sounds are normal normal S1 normal S2 no murmurs or gallops no rub


Abdomen soft and extremity warm no edema





Impression


Acute inferior wall MI status post coronary stenting to the RCA and to the PLV


Patient also has a tight lesion in the LAD and diagonal branch 80% after that 

the LAD was subtotally occluded





Suggest


Continue current medications continue statins and do not a platelet therapy


I discussed this with Dr. Powell and he would like to proceed with coronary 

intervention on the LAD/tidal vessel in the next 1-2 days


Patient may go to telemetry today





Objective





- Vital Signs


Vital signs: 


                                   Vital Signs











Temp  98.0 F   10/14/19 04:00


 


Pulse  69   10/14/19 12:00


 


Resp  12   10/14/19 12:00


 


BP  114/71   10/14/19 12:00


 


Pulse Ox  95   10/14/19 11:00








                                 Intake & Output











 10/13/19 10/14/19 10/14/19





 18:59 06:59 18:59


 


Intake Total 1950 350 0


 


Output Total 1800 1400 500


 


Balance 150 -1050 -500


 


Weight 86.183 kg 87.4 kg 


 


Intake:   


 


    


 


    Sodium Chloride 0.9% 1, 450  





    000 ml @ 75 mls/hr IV .   





    [id number] [facility] Rx#:[id number]   


 


  Intake, IV Titration   0





  Amount   


 


    Bivalirudin 250 mg In   0





    Sodium Chloride 0.9% 50   





    ml @ 0 mls/hr IV .STK-MED   





    [facility] Rx#:[id number]   


 


    Sodium Chloride 0.9% 1,   0





    000 ml @ 20 mls/hr IV .   





    Q24H [facility] Rx#:[id number]   


 


    Sodium Chloride 0.9% 1,   0





    000 ml @ 75 mls/hr IV .   





    [id number] [facility] Rx#:[id number]   


 


  Oral 1500 350 


 


Output:   


 


  Urine 1800 1400 500


 


Other:   


 


  Voiding Method Urinal Urinal Urinal








                       ABP, PAP, CO, CI - Last Documented











Arterial Blood Pressure        137/77

















- Labs


CBC & Chem 7: 


                                 10/14/19 04:02





                                 10/14/19 04:05


Labs: 


                  Abnormal Lab Results - Last 24 Hours (Table)











  10/13/19 10/13/19 10/14/19 Range/Units





  16:29 20:09 04:05 


 


Glucose    139 H  (74-99)  mg/dL


 


POC Glucose (mg/dL)  194 H  186 H   (75-99)  mg/dL


 


HDL Cholesterol     (40-60)  mg/dL














  10/14/19 10/14/19 10/14/19 Range/Units





  04:05 06:37 11:27 


 


Glucose     (74-99)  mg/dL


 


POC Glucose (mg/dL)   142 H  154 H  (75-99)  mg/dL


 


HDL Cholesterol  30 L    (40-60)  mg/dL

## 2019-10-15 LAB
BASOPHILS # BLD AUTO: 0 K/UL (ref 0–0.2)
BASOPHILS NFR BLD AUTO: 0 %
EOSINOPHIL # BLD AUTO: 0.1 K/UL (ref 0–0.7)
EOSINOPHIL NFR BLD AUTO: 1 %
ERYTHROCYTE [DISTWIDTH] IN BLOOD BY AUTOMATED COUNT: 4.67 M/UL (ref 4.3–5.9)
ERYTHROCYTE [DISTWIDTH] IN BLOOD: 12.6 % (ref 11.5–15.5)
GLUCOSE BLD-MCNC: 165 MG/DL (ref 75–99)
GLUCOSE BLD-MCNC: 180 MG/DL (ref 75–99)
GLUCOSE BLD-MCNC: 189 MG/DL (ref 75–99)
GLUCOSE BLD-MCNC: 210 MG/DL (ref 75–99)
HCT VFR BLD AUTO: 43 % (ref 39–53)
HGB BLD-MCNC: 14.6 GM/DL (ref 13–17.5)
LYMPHOCYTES # SPEC AUTO: 3.1 K/UL (ref 1–4.8)
LYMPHOCYTES NFR SPEC AUTO: 32 %
MCH RBC QN AUTO: 31.3 PG (ref 25–35)
MCHC RBC AUTO-ENTMCNC: 34 G/DL (ref 31–37)
MCV RBC AUTO: 92 FL (ref 80–100)
MONOCYTES # BLD AUTO: 0.7 K/UL (ref 0–1)
MONOCYTES NFR BLD AUTO: 7 %
NEUTROPHILS # BLD AUTO: 5.6 K/UL (ref 1.3–7.7)
NEUTROPHILS NFR BLD AUTO: 58 %
PLATELET # BLD AUTO: 179 K/UL (ref 150–450)
WBC # BLD AUTO: 9.7 K/UL (ref 3.8–10.6)

## 2019-10-15 RX ADMIN — CARVEDILOL SCH MG: 3.12 TABLET, FILM COATED ORAL at 17:24

## 2019-10-15 RX ADMIN — ATORVASTATIN CALCIUM SCH MG: 80 TABLET, FILM COATED ORAL at 20:40

## 2019-10-15 RX ADMIN — NICOTINE SCH PATCH: 14 PATCH, EXTENDED RELEASE TRANSDERMAL at 08:08

## 2019-10-15 RX ADMIN — METOPROLOL TARTRATE SCH MG: 25 TABLET, FILM COATED ORAL at 08:08

## 2019-10-15 RX ADMIN — INSULIN ASPART SCH UNIT: 100 INJECTION, SOLUTION INTRAVENOUS; SUBCUTANEOUS at 12:15

## 2019-10-15 RX ADMIN — PRASUGREL SCH MG: 10 TABLET, FILM COATED ORAL at 08:08

## 2019-10-15 RX ADMIN — INSULIN ASPART SCH UNIT: 100 INJECTION, SOLUTION INTRAVENOUS; SUBCUTANEOUS at 17:21

## 2019-10-15 RX ADMIN — INSULIN ASPART SCH UNIT: 100 INJECTION, SUSPENSION SUBCUTANEOUS at 17:20

## 2019-10-15 RX ADMIN — SPIRONOLACTONE SCH MG: 25 TABLET, FILM COATED ORAL at 12:15

## 2019-10-15 RX ADMIN — INSULIN ASPART SCH UNIT: 100 INJECTION, SOLUTION INTRAVENOUS; SUBCUTANEOUS at 20:40

## 2019-10-15 RX ADMIN — ASPIRIN 81 MG CHEWABLE TABLET SCH MG: 81 TABLET CHEWABLE at 08:08

## 2019-10-15 RX ADMIN — LISINOPRIL SCH MG: 2.5 TABLET ORAL at 08:08

## 2019-10-15 RX ADMIN — INSULIN ASPART SCH UNIT: 100 INJECTION, SOLUTION INTRAVENOUS; SUBCUTANEOUS at 07:08

## 2019-10-15 RX ADMIN — INSULIN ASPART SCH UNIT: 100 INJECTION, SUSPENSION SUBCUTANEOUS at 07:07

## 2019-10-15 NOTE — P.PN
Subjective





Patient is resting comfortably in bed.  He is doing well no chest discomfort 

dizziness lightheadedness or palpitations


Afebrile 97.9F pulse rate in the 60s to 70s


Normal respirations


Blood pressure 122/76


No JVD


Breath sounds are clear no rhonchi no crackles


Normal heart sounds no murmurs or gallop.


Abdomen soft nontender


Extremity warm no edema





Impression 


acute myocardial infarction inferior wall


Aspiration thrombectomy RCA


Status post stenting to the mid and distal RCA and PLV


He has a subtotally occluded mid LAD just beyond the takeoff of the diagonal 

vessel.  He also has an 80% diagonal disease 


follow-up 2-D echo and Doppler study shows an ejection fraction of 30-35%





Suggest


Switched to carvedilol 3.125 g twice daily


Lisinopril in the afternoon


Start spironolactone


Continue dual antiplatelet therapy


Effient is not covered and therefore upon discharge we will switch him to Plavix




for now we will continue Effient as an inpatient


Aspirin to continue


Spironolactone will be added


DC metoprolol and switch to carvedilol





Continue telemetry monitoring


Since he has a severely reduced LV systolic function without keep her medicine 

inpatient maximize his heart failure medications and revascularize the LAD 

territory


Discussed with Dr. Powell





Transferred to [address]














Objective





- Vital Signs


Vital signs: 


                                   Vital Signs











Temp  97.9 F   10/15/19 08:00


 


Pulse  72   10/15/19 08:00


 


Resp  19   10/15/19 08:00


 


BP  122/76   10/15/19 08:00


 


Pulse Ox  97   10/15/19 08:00








                                 Intake & Output











 10/14/19 10/15/19 10/15/19





 18:59 06:59 18:59


 


Intake Total 0 480 


 


Output Total 800 600 0


 


Balance -800 -120 0


 


Weight  84.2 kg 


 


Intake:   


 


  Intake, IV Titration 0  





  Amount   


 


    Bivalirudin 250 mg In 0  





    Sodium Chloride 0.9% 50   





    ml @ 0 mls/hr IV .STK-MED   





    [facility] Rx#:[id number]   


 


    Sodium Chloride 0.9% 1, 0  





    000 ml @ 20 mls/hr IV .   





    Q24H [facility] Rx#:[id number]   


 


    Sodium Chloride 0.9% 1, 0  





    000 ml @ 75 mls/hr IV .   





    [id number] [facility] Rx#:[id number]   


 


  Oral  480 


 


Output:   


 


  Urine 800 600 0


 


Other:   


 


  Voiding Method Urinal Urinal Urinal


 


  # Voids  0 








                       ABP, PAP, CO, CI - Last Documented











Arterial Blood Pressure        137/77

















- Labs


CBC & Chem 7: 


                                 10/15/19 03:55





                                 10/14/19 04:05


Labs: 


                  Abnormal Lab Results - Last 24 Hours (Table)











  10/13/19 10/14/19 10/14/19 Range/Units





  04:50 11:27 16:46 


 


POC Glucose (mg/dL)   154 H  176 H  (75-99)  mg/dL


 


Hemoglobin A1c  9.4 H    (4.0-6.0)  %














  10/14/19 10/15/19 Range/Units





  20:23 06:57 


 


POC Glucose (mg/dL)  216 H  189 H  (75-99)  mg/dL


 


Hemoglobin A1c    (4.0-6.0)  %

## 2019-10-15 NOTE — P.PN
Subjective


59-year-old male with past medical history of hypertension, diabetes mellitus, 

myocardial infarction coming into the hospital with a chief complaint of left-

sided chest pain that has been radiating to the left shoulder. 


In the emergency department initially there was a concern for her tic dissection

so the patient had CT angios of the chest this was negative.  The EKG was 

negative but subsequent EKG was showing ST segment elevation in 203 and aVF 

leads and the code STEMI pager was activated.  So the patient was taken to the 

cath lab by Dr. Nieto.  It revealed acute total occlusion of the mid RCA and 

severe disease involving the distal RCA .  So the patient had thrombectomy and 

stenting of the mid RCA and also the distal RCA.  Later on patient has been 

transferred to the ICU.





On 10/14/2019 - patient is sitting up in a chair by the bedside.  He denies 

having any chest pain or left shoulder pain.  No cough or difficulty in 

breathing.  He denies any swelling in the right groin, various access for 

cardiac cath was done.  No abdominal pain nausea vomiting or diarrhea.  No 

dysuria or hematuria.  Patient's blood sugars have been running between 200 to 

150s.  He is on insulin 70 x 30 - 5 units twice a day.  Patient's hemoglobin has

been stable.





10/15/2019


Patient will undergo staged intervention to LAD patient received stents to RCA. 

Patient had EF of 30-35%.





Constitutional: Denied any fatigue denied any fever.


Cardio vascular: denied any chest pain, palpitations


Gastrointestinal denied any nausea vomiting


Pulmonary: Denied any shortness of breath cough


Neurologic denied any new focal deficits





All inpatient medications were reviewed and appropriate changes in these 

medications as dictated in the interval history and assessment and plan.











Objective





- Vital Signs


Vital signs: 


                                   Vital Signs











Temp  97.7 F   10/15/19 16:00


 


Pulse  67   10/15/19 16:00


 


Resp  12   10/15/19 16:00


 


BP  129/89   10/15/19 16:00


 


Pulse Ox  98   10/15/19 16:00








                                 Intake & Output











 10/14/19 10/15/19 10/15/19





 18:59 06:59 18:59


 


Intake Total 0 480 


 


Output Total 800 600 0


 


Balance -800 -120 0


 


Weight  84.2 kg 


 


Intake:   


 


  Intake, IV Titration 0  





  Amount   


 


    Bivalirudin 250 mg In 0  





    Sodium Chloride 0.9% 50   





    ml @ 0 mls/hr IV .STK-MED   





    [facility] Rx#:[id number]   


 


    Sodium Chloride 0.9% 1, 0  





    000 ml @ 20 mls/hr IV .   





    Q24H [facility] Rx#:[id number]   


 


    Sodium Chloride 0.9% 1, 0  





    000 ml @ 75 mls/hr IV .   





    [id number] [facility] Rx#:[id number]   


 


  Oral  480 


 


Output:   


 


  Urine 800 600 0


 


Other:   


 


  Voiding Method Urinal Urinal Toilet


 


  # Voids  0 1








                       ABP, PAP, CO, CI - Last Documented











Arterial Blood Pressure        137/77

















- Exam








PHYSICAL EXAMINATION: 





GENERAL: The patient is alert and oriented x3, not in any acute distress. Well 

developed, well nourished. 


HEENT: Pupils are round and equally reacting to light. EOMI. No scleral icterus.

No conjunctival pallor. Normocephalic, atraumatic. No pharyngeal erythema. No 

thyromegaly. 


CARDIOVASCULAR: S1 and S2 present. No murmurs, rubs, or gallops. 


PULMONARY: Chest is clear to auscultation, no wheezing or crackles. 


ABDOMEN: Soft, nontender, nondistended, normoactive bowel sounds. No palpable 

organomegaly. 


MUSCULOSKELETAL: No joint swelling or deformity.


EXTREMITIES: No cyanosis, clubbing, or pedal edema. 


NEUROLOGICAL: Gross neurological examination did not reveal any focal deficits. 


SKIN: No rashes. 

















- Labs


CBC & Chem 7: 


                                 10/15/19 03:55





                                 10/14/19 04:05


Labs: 


                  Abnormal Lab Results - Last 24 Hours (Table)











  10/14/19 10/15/19 10/15/19 Range/Units





  20:23 06:57 11:47 


 


POC Glucose (mg/dL)  216 H  189 H  180 H  (75-99)  mg/dL














  10/15/19 Range/Units





  17:09 


 


POC Glucose (mg/dL)  210 H  (75-99)  mg/dL














Assessment and Plan


Plan: 


-ST elevation microinfarction: Status post witnessed cardiac catheterization and

stenting of RCA staged intervention to LAD either Thursday or Friday.  Continue 

with dual antiplatelet therapy statin, beta blocker 


-type 2 diabetes mellitus poorly well-controlled any with present regimen 

patient probably can be switched to metformin upon discharge.


-Nicotine dependence: Counseling was provided


-Hypertension

## 2019-10-16 LAB
ANION GAP SERPL CALC-SCNC: 6 MMOL/L
BASOPHILS # BLD AUTO: 0.1 K/UL (ref 0–0.2)
BASOPHILS NFR BLD AUTO: 1 %
BUN SERPL-SCNC: 17 MG/DL (ref 9–20)
CALCIUM SPEC-MCNC: 9.4 MG/DL (ref 8.4–10.2)
CHLORIDE SERPL-SCNC: 104 MMOL/L (ref 98–107)
CO2 SERPL-SCNC: 28 MMOL/L (ref 22–30)
EOSINOPHIL # BLD AUTO: 0.1 K/UL (ref 0–0.7)
EOSINOPHIL NFR BLD AUTO: 1 %
ERYTHROCYTE [DISTWIDTH] IN BLOOD BY AUTOMATED COUNT: 4.67 M/UL (ref 4.3–5.9)
ERYTHROCYTE [DISTWIDTH] IN BLOOD: 12.7 % (ref 11.5–15.5)
GLUCOSE BLD-MCNC: 141 MG/DL (ref 75–99)
GLUCOSE BLD-MCNC: 151 MG/DL (ref 75–99)
GLUCOSE BLD-MCNC: 160 MG/DL (ref 75–99)
GLUCOSE BLD-MCNC: 220 MG/DL (ref 75–99)
GLUCOSE SERPL-MCNC: 160 MG/DL (ref 74–99)
HCT VFR BLD AUTO: 43.4 % (ref 39–53)
HGB BLD-MCNC: 14 GM/DL (ref 13–17.5)
LYMPHOCYTES # SPEC AUTO: 3 K/UL (ref 1–4.8)
LYMPHOCYTES NFR SPEC AUTO: 32 %
MCH RBC QN AUTO: 30 PG (ref 25–35)
MCHC RBC AUTO-ENTMCNC: 32.3 G/DL (ref 31–37)
MCV RBC AUTO: 92.8 FL (ref 80–100)
MONOCYTES # BLD AUTO: 0.7 K/UL (ref 0–1)
MONOCYTES NFR BLD AUTO: 7 %
NEUTROPHILS # BLD AUTO: 5.5 K/UL (ref 1.3–7.7)
NEUTROPHILS NFR BLD AUTO: 58 %
PLATELET # BLD AUTO: 163 K/UL (ref 150–450)
POTASSIUM SERPL-SCNC: 4.2 MMOL/L (ref 3.5–5.1)
SODIUM SERPL-SCNC: 138 MMOL/L (ref 137–145)
WBC # BLD AUTO: 9.5 K/UL (ref 3.8–10.6)

## 2019-10-16 PROCEDURE — X2C0361 EXTIRPATION OF MATTER FROM CORONARY ARTERY, ONE ARTERY USING ORBITAL ATHERECTOMY TECHNOLOGY, PERCUTANEOUS APPROACH, NEW TECHNOLOGY GROUP 1: ICD-10-PCS

## 2019-10-16 PROCEDURE — 027035Z DILATION OF CORONARY ARTERY, ONE ARTERY WITH TWO DRUG-ELUTING INTRALUMINAL DEVICES, PERCUTANEOUS APPROACH: ICD-10-PCS

## 2019-10-16 RX ADMIN — INSULIN ASPART SCH: 100 INJECTION, SOLUTION INTRAVENOUS; SUBCUTANEOUS at 17:50

## 2019-10-16 RX ADMIN — MIDAZOLAM ONE MG: 1 INJECTION INTRAMUSCULAR; INTRAVENOUS at 18:05

## 2019-10-16 RX ADMIN — NICARDIPINE HYDROCHLORIDE ONE MCG: 2.5 INJECTION INTRAVENOUS at 19:44

## 2019-10-16 RX ADMIN — NITROGLYCERIN ONE MCG: 10 INJECTION INTRAVENOUS at 19:43

## 2019-10-16 RX ADMIN — ASPIRIN 81 MG CHEWABLE TABLET SCH MG: 81 TABLET CHEWABLE at 09:11

## 2019-10-16 RX ADMIN — LISINOPRIL SCH MG: 2.5 TABLET ORAL at 12:37

## 2019-10-16 RX ADMIN — INSULIN ASPART SCH UNIT: 100 INJECTION, SOLUTION INTRAVENOUS; SUBCUTANEOUS at 12:37

## 2019-10-16 RX ADMIN — INSULIN ASPART SCH UNIT: 100 INJECTION, SUSPENSION SUBCUTANEOUS at 07:40

## 2019-10-16 RX ADMIN — NICARDIPINE HYDROCHLORIDE ONE MCG: 2.5 INJECTION INTRAVENOUS at 18:58

## 2019-10-16 RX ADMIN — NICARDIPINE HYDROCHLORIDE ONE MCG: 2.5 INJECTION INTRAVENOUS at 19:43

## 2019-10-16 RX ADMIN — INSULIN ASPART SCH UNIT: 100 INJECTION, SOLUTION INTRAVENOUS; SUBCUTANEOUS at 20:43

## 2019-10-16 RX ADMIN — CARVEDILOL SCH MG: 3.12 TABLET, FILM COATED ORAL at 09:12

## 2019-10-16 RX ADMIN — FENTANYL CITRATE ONE MCG: 50 INJECTION, SOLUTION INTRAMUSCULAR; INTRAVENOUS at 19:44

## 2019-10-16 RX ADMIN — PRASUGREL SCH MG: 10 TABLET, FILM COATED ORAL at 09:11

## 2019-10-16 RX ADMIN — SPIRONOLACTONE SCH MG: 25 TABLET, FILM COATED ORAL at 09:12

## 2019-10-16 RX ADMIN — INSULIN ASPART SCH: 100 INJECTION, SUSPENSION SUBCUTANEOUS at 17:51

## 2019-10-16 RX ADMIN — NITROGLYCERIN ONE MCG: 10 INJECTION INTRAVENOUS at 18:52

## 2019-10-16 RX ADMIN — NITROGLYCERIN ONE MCG: 10 INJECTION INTRAVENOUS at 19:03

## 2019-10-16 RX ADMIN — NICARDIPINE HYDROCHLORIDE ONE MCG: 2.5 INJECTION INTRAVENOUS at 19:21

## 2019-10-16 RX ADMIN — NICOTINE SCH PATCH: 14 PATCH, EXTENDED RELEASE TRANSDERMAL at 09:11

## 2019-10-16 RX ADMIN — ATORVASTATIN CALCIUM SCH MG: 80 TABLET, FILM COATED ORAL at 20:43

## 2019-10-16 RX ADMIN — NITROGLYCERIN ONE MCG: 10 INJECTION INTRAVENOUS at 19:44

## 2019-10-16 RX ADMIN — NICARDIPINE HYDROCHLORIDE ONE MCG: 2.5 INJECTION INTRAVENOUS at 19:19

## 2019-10-16 RX ADMIN — INSULIN ASPART SCH UNIT: 100 INJECTION, SOLUTION INTRAVENOUS; SUBCUTANEOUS at 07:40

## 2019-10-16 RX ADMIN — CARVEDILOL SCH MG: 3.12 TABLET, FILM COATED ORAL at 20:43

## 2019-10-16 RX ADMIN — NITROGLYCERIN ONE MCG: 10 INJECTION INTRAVENOUS at 18:53

## 2019-10-16 RX ADMIN — MIDAZOLAM ONE MG: 1 INJECTION INTRAMUSCULAR; INTRAVENOUS at 18:55

## 2019-10-16 RX ADMIN — FENTANYL CITRATE ONE MCG: 50 INJECTION, SOLUTION INTRAMUSCULAR; INTRAVENOUS at 19:35

## 2019-10-16 NOTE — PTCA
PERCUTANEOUSTRANS CORORONARY ANGIOGRAPHY



DATE OF SERVICE:

October 16, 2019



PERFORMING PHYSICIAN:

Juanjose Powell MD, interventional cardiologist.



PROCEDURE PERFORMED:

1. Successful crossing chronic total occlusion of the mid left anterior descending

    artery.

2. Successful atherectomy of the mid left anterior descending artery using the orbital

    atherectomy device.

3. Successful stenting of the mid LAD using 2.25 x 23 mm Xience HAI with reduction of

    stenosis from 100% to 0%.

4. Successful stenting of the first diagonal branch of the LAD using 2.5 x 23 mm

    Xience HAI with an excellent angiographic results.



INDICATION:

This is a 59-year-old gentleman with history of coronary artery disease and prior

stenting of the RCA who unfortunately continues to smoke, presented to the hospital on

Saturday night complaining of chest discomfort and he was diagnosed with acute inferior

ST-elevation myocardial infarction.  He underwent an emergent heart catheterization and

was found to have an acute total occlusion of the mid RCA.  He underwent successful

stenting of the mid RCA and also successful stenting of the distal RCA and as well as

the PLV branch of the RCA.  He also was found to have a chronically occluded LAD in the

midportion and severe disease involving the first diagonal branch which is a large

caliber vessel.  He was brought today to undergo a PTCA of both arteries.



APPROACH:

Right common femoral artery.



COMPLICATION:

None.



LEVEL OF SEDATION:

Moderate with sedation length of 113 minutes.



PROCEDURE DESCRIPTION:

After obtaining an informed consent, the patient was brought to the cardiac cath lab.

The right common femoral artery was cannulated using micropuncture technique and a

micropuncture wire passed easily. Then I placed a 6-Mont[place] sheath 11 cm in the right

common femoral artery.



At that point, anticoagulation was initiated using Angiomax, where the patient was

given a bolus and drip of Angiomax.  After that, I did engage the left main using J

using 3.5 guide.



I attempted crossing the chronic total occlusion of the mid LAD using a whisper wire

with the backup support of _____catheter, but the Whisper wire did not cross the LAD.

At that point, I did exchange my Whisper wire into filter XT wire.  The filter XT wire

crossed the chronic total occlusion, but it went into a small 2nd diagonal branch of

the LAD.  At that point, I left the wire in that 2nd small diagonal branch and I did

pull the Melania catheter out.  Then I did try again crossing the chronic total

occlusion in the LAD using the Whisper wire after I left the filter XT wire in the

second diagonal branch, but I was unable.  After that, I did have with the backup

support of Melania catheter.  I was able to cross the chronic total occlusion and

advance the wire to the distal LAD.  With difficulties, I was able to advance the

Melania XT wire all the way to the distal LAD.  At that point, I did exchange my long

Whisper wire into long ViperWire preparing for rotational atherectomy using the CSI

device.



I did rotational atherectomy of the mid LAD using only low speed run.  After that I did

balloon angioplasty using initially 2.0 x 15 mm and then 2.5 x 15 mm balloon.  The

following angiogram showed no flow in the mid LAD nor the distal LAD.  I did give the

patient multiple intracoronary injection of nitroglycerin as well as nicardipine.  With

that, I was unable to obtain any flow in the mid and distal LAD just distal to the

large first diagonal branch.  At that point, I did aspiration thrombectomy of the LAD

using export catheter.  I was unable to extract any thrombus.  After that I did wiggle

the wire in the LAD multiple times back and forth and with that I was able to obtain

some flow in the mid to distal LAD.  At that point, I decided to go ahead and stent the

diagonal as well as stent the LAD.  I did wire the LAD using a whisper wire.  I did do

balloon angioplasty using 2.5 x 12 mm balloon before I deployed 2.5 x 23 mm Xience HAI

where the stent was positioned under fluoroscopy guidance and deployed under 12

atmospheres for 20 seconds with the following angiogram showing excellent angiographic

results.



For the LAD, I did deploy 2.25 x 23 mm another Xience HAI where the stent was

positioned under fluoroscopy guidance just distal to the bifurcation of the large

diagonal branch and the stent in the LAD was deployed under 12 atmospheres for 20

seconds.  I did after that gave the patient multiple injections of nitroglycerin as

well as nicardipine.  With that, I was able to obtain flow in the mid LAD with a

sluggish flow in the distal LAD.  At that point, I decided to stop.



POSTPROCEDURE MANAGEMENT:

1. Dual anti-platelet therapy.

2. Risk factors modifications.

3. Follow up with the patient.





MMODL / IJN: [id number] / Job#: [id number]

## 2019-10-16 NOTE — P.PN
Subjective


This is Vero Fish PA-C dictating a progress note on this patient


The patient was interviewed and examined by me as well as by Dr. Levy


Case discussed with Dr. Levy and he agrees with the plan of care





IMPRESSION / ASSESSMENT: 


inferior wall ST elevation MI, status post stenting to the mid and distal RCA 

and PLV


CAD s/p stenting


subtotally occluded mid LAD and 80% diagonal disease


ischemic cardiomyopathy, EF 30-35%


hypertension


Dyslipidemia


Diabetes


former smoker





PLAN:


planning for PCI today to revascularize LAD


continue maximal medical magement of CAD and Ischemic cardiomyopathy with beta 

blockers,aspirin, statins, ACE inhibitors and spironolactone


continue dual Antiplatelet therapy


may be transfered to [facility] with telemetry


continue to monitor telemetry 





HPI/interval history


patient is a 59-year-old male with a past medical history of CAD status post 

stenting who presented to the em with an acute inferior ST elevation MI.  He 

underwent stenting to the mid and distal RCA and PLV.  Subsequent echocardiogram

showed reduced systolic function, EF 30-35%.  Yesterday we started him on 

spironolactone, carvedilol, and lisinopril.  He tolerated his medication changes

well.  He should seen and examined resting comfortably in bed.  Denies any chest

pain, shortness of breath, dizziness lightheadedness or palpitations.  He slept 

comfortably at night, no difficulty breathing.  No shortness of breath on 

exertion.





EXAMINATION


temperature 98F, pulse 61, respirations 16, blood pressure 119/73,oxygen 

saturation 99% on room air


Patient seen and examined resting comfortably in bed, in no acute distress


lungs clear to auscultation bilaterally, no wheezing rhonchi or crackles


Heart is regular, normal S1-S2, no murmurs noted


No elevated JVD


No lower extremity edema





REVIEW OF LABS, ECG


WBC 9.5, hemoglobin 14.0, platelets 163, potassium 4.2, BUN 17, creatinine 0.72





telemetry revealed sinus mechanism, rates in the 70s, no arrhythmias overnight








Objective





- Vital Signs


Vital signs: 


                                   Vital Signs











Temp  98 F   10/16/19 12:00


 


Pulse  61   10/16/19 12:00


 


Resp  16   10/16/19 12:00


 


BP  119/73   10/16/19 12:00


 


Pulse Ox  99   10/16/19 12:00








                                 Intake & Output











 10/15/19 10/16/19 10/16/19





 18:59 06:59 18:59


 


Intake Total  1380 480


 


Output Total 0  


 


Balance 0 1380 480


 


Weight  84.1 kg 84.1 kg


 


Intake:   


 


  Oral  1380 480


 


Output:   


 


  Urine 0  


 


Other:   


 


  Voiding Method Toilet Toilet Toilet


 


  # Voids 1 2 1








                       ABP, PAP, CO, CI - Last Documented











Arterial Blood Pressure        137/77

















- Labs


CBC & Chem 7: 


                                 10/16/19 04:47





                                 10/16/19 04:47


Labs: 


                  Abnormal Lab Results - Last 24 Hours (Table)











  10/15/19 10/15/19 10/16/19 Range/Units





  17:09 20:37 04:47 


 


Glucose    160 H  (74-99)  mg/dL


 


POC Glucose (mg/dL)  210 H  165 H   (75-99)  mg/dL














  10/16/19 10/16/19 Range/Units





  07:36 12:28 


 


Glucose    (74-99)  mg/dL


 


POC Glucose (mg/dL)  220 H  160 H  (75-99)  mg/dL

## 2019-10-16 NOTE — P.PN
Subjective


59-year-old male with past medical history of hypertension, diabetes mellitus, 

myocardial infarction coming into the hospital with a chief complaint of left-

sided chest pain that has been radiating to the left shoulder. 


In the emergency department initially there was a concern for her tic dissection

so the patient had CT angios of the chest this was negative.  The EKG was 

negative but subsequent EKG was showing ST segment elevation in 203 and aVF 

leads and the code STEMI pager was activated.  So the patient was taken to the 

cath lab by Dr. Nieto.  It revealed acute total occlusion of the mid RCA and 

severe disease involving the distal RCA .  So the patient had thrombectomy and 

stenting of the mid RCA and also the distal RCA.  Later on patient has been 

transferred to the ICU.





On 10/14/2019 - patient is sitting up in a chair by the bedside.  He denies 

having any chest pain or left shoulder pain.  No cough or difficulty in 

breathing.  He denies any swelling in the right groin, various access for 

cardiac cath was done.  No abdominal pain nausea vomiting or diarrhea.  No 

dysuria or hematuria.  Patient's blood sugars have been running between 200 to 

150s.  He is on insulin 70 x 30 - 5 units twice a day.  Patient's hemoglobin has

been stable.





10/15/2019


Patient will undergo staged intervention to LAD patient received stents to RCA. 

Patient had EF of 30-35%.





10/16/2019


Patient underwent will undergo cardiac catheterization Lasix later today and 

overnight events





Constitutional: Denied any fatigue denied any fever.


Cardio vascular: denied any chest pain, palpitations


Gastrointestinal denied any nausea vomiting


Pulmonary: Denied any shortness of breath cough


Neurologic denied any new focal deficits





All inpatient medications were reviewed and appropriate changes in these 

medications as dictated in the interval history and assessment and plan.











Objective





- Vital Signs


Vital signs: 


                                   Vital Signs











Temp  98 F   10/16/19 12:00


 


Pulse  61   10/16/19 12:00


 


Resp  16   10/16/19 12:00


 


BP  119/73   10/16/19 12:00


 


Pulse Ox  99   10/16/19 12:00








                                 Intake & Output











 10/15/19 10/16/19 10/16/19





 18:59 06:59 18:59


 


Intake Total  1380 480


 


Output Total 0  


 


Balance 0 1380 480


 


Weight  84.1 kg 84.1 kg


 


Intake:   


 


  Oral  1380 480


 


Output:   


 


  Urine 0  


 


Other:   


 


  Voiding Method Toilet Toilet Toilet


 


  # Voids 1 2 1








                       ABP, PAP, CO, CI - Last Documented











Arterial Blood Pressure        137/77

















- Exam








PHYSICAL EXAMINATION: 





GENERAL: The patient is alert and oriented x3, not in any acute distress. Well 

developed, well nourished. 


HEENT: Pupils are round and equally reacting to light. EOMI. No scleral icterus.

No conjunctival pallor. Normocephalic, atraumatic. No pharyngeal erythema. No 

thyromegaly. 


CARDIOVASCULAR: S1 and S2 present. No murmurs, rubs, or gallops. 


PULMONARY: Chest is clear to auscultation, no wheezing or crackles. 


ABDOMEN: Soft, nontender, nondistended, normoactive bowel sounds. No palpable 

organomegaly. 


MUSCULOSKELETAL: No joint swelling or deformity.


EXTREMITIES: No cyanosis, clubbing, or pedal edema. 


NEUROLOGICAL: Gross neurological examination did not reveal any focal deficits. 


SKIN: No rashes. 

















- Labs


CBC & Chem 7: 


                                 10/16/19 04:47





                                 10/16/19 04:47


Labs: 


                  Abnormal Lab Results - Last 24 Hours (Table)











  10/15/19 10/15/19 10/16/19 Range/Units





  17:09 20:37 04:47 


 


Glucose    160 H  (74-99)  mg/dL


 


POC Glucose (mg/dL)  210 H  165 H   (75-99)  mg/dL














  10/16/19 10/16/19 Range/Units





  07:36 12:28 


 


Glucose    (74-99)  mg/dL


 


POC Glucose (mg/dL)  220 H  160 H  (75-99)  mg/dL














Assessment and Plan


Plan: 


-ST elevation microinfarction: Status post witnessed cardiac catheterization and

stenting of RCA staged intervention to LAD most probably today.  Continue with 

dual antiplatelet therapy statin, beta blocker 


-type 2 diabetes mellitus poorly well-controlled any with present regimen 

patient probably can be switched to metformin upon discharge.


-Nicotine dependence: Counseling was provided


-Hypertension

## 2019-10-17 VITALS — RESPIRATION RATE: 20 BRPM | SYSTOLIC BLOOD PRESSURE: 118 MMHG | DIASTOLIC BLOOD PRESSURE: 72 MMHG | HEART RATE: 70 BPM

## 2019-10-17 VITALS — TEMPERATURE: 97.6 F

## 2019-10-17 LAB
ANION GAP SERPL CALC-SCNC: 8 MMOL/L
BASOPHILS # BLD AUTO: 0 K/UL (ref 0–0.2)
BASOPHILS NFR BLD AUTO: 0 %
BUN SERPL-SCNC: 19 MG/DL (ref 9–20)
CALCIUM SPEC-MCNC: 9.1 MG/DL (ref 8.4–10.2)
CHLORIDE SERPL-SCNC: 106 MMOL/L (ref 98–107)
CO2 SERPL-SCNC: 24 MMOL/L (ref 22–30)
EOSINOPHIL # BLD AUTO: 0.1 K/UL (ref 0–0.7)
EOSINOPHIL NFR BLD AUTO: 1 %
ERYTHROCYTE [DISTWIDTH] IN BLOOD BY AUTOMATED COUNT: 4.35 M/UL (ref 4.3–5.9)
ERYTHROCYTE [DISTWIDTH] IN BLOOD: 12.4 % (ref 11.5–15.5)
GLUCOSE BLD-MCNC: 203 MG/DL (ref 75–99)
GLUCOSE BLD-MCNC: 215 MG/DL (ref 75–99)
GLUCOSE SERPL-MCNC: 198 MG/DL (ref 74–99)
HCT VFR BLD AUTO: 40.5 % (ref 39–53)
HGB BLD-MCNC: 14 GM/DL (ref 13–17.5)
LYMPHOCYTES # SPEC AUTO: 2.3 K/UL (ref 1–4.8)
LYMPHOCYTES NFR SPEC AUTO: 26 %
MCH RBC QN AUTO: 32.2 PG (ref 25–35)
MCHC RBC AUTO-ENTMCNC: 34.6 G/DL (ref 31–37)
MCV RBC AUTO: 93.1 FL (ref 80–100)
MONOCYTES # BLD AUTO: 0.6 K/UL (ref 0–1)
MONOCYTES NFR BLD AUTO: 7 %
NEUTROPHILS # BLD AUTO: 5.8 K/UL (ref 1.3–7.7)
NEUTROPHILS NFR BLD AUTO: 64 %
PLATELET # BLD AUTO: 154 K/UL (ref 150–450)
POTASSIUM SERPL-SCNC: 4.3 MMOL/L (ref 3.5–5.1)
SODIUM SERPL-SCNC: 138 MMOL/L (ref 137–145)
WBC # BLD AUTO: 9.1 K/UL (ref 3.8–10.6)

## 2019-10-17 RX ADMIN — PRASUGREL SCH MG: 10 TABLET, FILM COATED ORAL at 08:52

## 2019-10-17 RX ADMIN — ASPIRIN 81 MG CHEWABLE TABLET SCH MG: 81 TABLET CHEWABLE at 08:52

## 2019-10-17 RX ADMIN — NICOTINE SCH PATCH: 14 PATCH, EXTENDED RELEASE TRANSDERMAL at 08:52

## 2019-10-17 RX ADMIN — INSULIN ASPART SCH UNIT: 100 INJECTION, SOLUTION INTRAVENOUS; SUBCUTANEOUS at 12:21

## 2019-10-17 RX ADMIN — INSULIN ASPART SCH UNIT: 100 INJECTION, SOLUTION INTRAVENOUS; SUBCUTANEOUS at 07:16

## 2019-10-17 RX ADMIN — CARVEDILOL SCH MG: 3.12 TABLET, FILM COATED ORAL at 07:16

## 2019-10-17 RX ADMIN — LISINOPRIL SCH MG: 2.5 TABLET ORAL at 08:52

## 2019-10-17 RX ADMIN — SPIRONOLACTONE SCH MG: 25 TABLET, FILM COATED ORAL at 08:52

## 2019-10-17 RX ADMIN — INSULIN ASPART SCH UNIT: 100 INJECTION, SUSPENSION SUBCUTANEOUS at 07:15

## 2019-10-17 NOTE — P.PN
Subjective


This is Vero Fish PA-C dictating a progress note on this patient


The patient was interviewed and examined by me as well as by Dr. Levy


Case discussed with Dr. Levy and he agrees with the plan of care





IMPRESSION / ASSESSMENT: 


inferior wall ST elevation MI, status post stenting to the mid and distal RCA 

and PLV


CAD s/p previous stenting, with subsequent stenting to the mid LAD and first 

diagonal branch of the LAD


ischemic cardiomyopathy, EF 30-35%


hypertension


Dyslipidemia


Diabetes


former smoker





PLAN:


continue maximal medical magement of CAD and Ischemic cardiomyopathy with beta 

blockers,aspirin, statins, ACE inhibitors and spironolactone


continue dual Antiplatelet therapy


Patient may go home with close cardiac follow-up him a cardiac standpoint.





HPI/interval history


patient is a 59-year-old male with a past medical history of CAD status post 

stenting who presented to the em with an acute inferior ST elevation MI.  He 

underwent stenting to the mid and distal RCA and PLV.  Subsequent echocardiogram

showed reduced systolic function, EF 30-35%.   Yesterday he underwent successful

stenting to the mid LAD and first diagonal branch of the LAD.  Patient seen and 

examined resting in his chair. Denies any chest pain, shortness of breath, 

dizziness lightheadedness or palpitations.  No orthopnea or PND.  No  bleeding p

roblems.  Has been up walking around.





EXAMINATION


Temperature 98.7F, pulse 66, respirations 17, blood pressure 100/61, oxygen 

saturation 96% on room air


Patient seen and examined resting comfortably in his chair, in no acute distress


lungs clear to auscultation bilaterally, no wheezing rhonchi or crackles


Heart is regular, normal S1-S2, no murmurs noted


No elevated JVD


No lower extremity edema


Dressing over the groin clean dry and intact, no palpable hematomas





REVIEW OF LABS, ECG


WBC 9.1, hemoglobin 14, platelets 154, potassium 4.3, BUN 19, creatinine 0.65





Objective





- Vital Signs


Vital signs: 


                                   Vital Signs











Temp  97.6 F   10/17/19 08:00


 


Pulse  70   10/17/19 12:00


 


Resp  20   10/17/19 12:00


 


BP  118/72   10/17/19 12:00


 


Pulse Ox  99   10/17/19 12:00








                                 Intake & Output











 10/16/19 10/17/19 10/17/19





 18:59 06:59 18:59


 


Intake Total 1117 375 480


 


Output Total  1000 


 


Balance 1117 -625 480


 


Weight 84.1 kg 85.4 kg 


 


Intake:   


 


   175 


 


    Sodium Chloride 0.9% 1,  150 





    000 ml @ 75 mls/hr IV .   





    [id number] [facility] Rx#:[id number]   


 


  Oral 480 200 480


 


Output:   


 


  Urine  1000 


 


Other:   


 


  Voiding Method Toilet Toilet Toilet


 


  # Voids 1  1


 


  # Bowel Movements 1  








                       ABP, PAP, CO, CI - Last Documented











Arterial Blood Pressure        116/65

















- Labs


CBC & Chem 7: 


                                 10/17/19 05:35





                                 10/17/19 05:35


Labs: 


                  Abnormal Lab Results - Last 24 Hours (Table)











  10/16/19 10/16/19 10/17/19 Range/Units





  17:21 20:40 05:35 


 


Creatinine    0.65 L  (0.66-1.25)  mg/dL


 


Glucose    198 H  (74-99)  mg/dL


 


POC Glucose (mg/dL)  151 H  141 H   (75-99)  mg/dL














  10/17/19 10/17/19 Range/Units





  07:11 11:26 


 


Creatinine    (0.66-1.25)  mg/dL


 


Glucose    (74-99)  mg/dL


 


POC Glucose (mg/dL)  215 H  203 H  (75-99)  mg/dL

## 2022-05-20 NOTE — P.DS
Providers


Date of admission: 


10/13/19 01:34





Attending physician: 


Te Tapia





Consults: 





                                        





10/13/19 01:35


Consult Physician Routine 


   Consulting Provider: Juanjose Powell


   Consult Reason/Comments: stemi


   Do you want consulting provider notified?: Already Contacted





10/13/19 03:53


Consult Physician Routine 


   Consulting Provider: Malou Rivera


   Consult Reason/Comments: Post Interventional patient


   Do you want consulting provider notified?: Already Contacted





10/16/19 19:52


Consult Physician Routine 


   Consulting Provider: Malou Rivera


   Consult Reason/Comments: Post Interventional patient


   Do you want consulting provider notified?: Already Contacted











Primary care physician: 


Stated None





Hospital Course: 


59-year-old male with past medical history of hypertension, diabetes mellitus, 

myocardial infarction coming into the hospital with a chief complaint of left-

sided chest pain that has been radiating to the left shoulder. 


In the emergency department initially there was a concern for her tic dissection

so the patient had CT angios of the chest this was negative.  The EKG was 

negative but subsequent EKG was showing ST segment elevation in 203 and aVF 

leads and the code STEMI pager was activated.  So the patient was taken to the 

cath lab by Dr. Nieto.  It revealed acute total occlusion of the mid RCA and 

severe disease involving the distal RCA .  So the patient had thrombectomy and 

stenting of the mid RCA and also the distal RCA.  Later on patient has been 

transferred to the ICU.





On 10/14/2019 - patient is sitting up in a chair by the bedside.  He denies 

having any chest pain or left shoulder pain.  No cough or difficulty in 

breathing.  He denies any swelling in the right groin, various access for 

cardiac cath was done.  No abdominal pain nausea vomiting or diarrhea.  No 

dysuria or hematuria.  Patient's blood sugars have been running between 200 to 

150s.  He is on insulin 70 x 30 - 5 units twice a day.  Patient's hemoglobin has

been stable.





10/15/2019


Patient will undergo staged intervention to LAD patient received stents to RCA. 

Patient had EF of 30-35%.





10/16/2019


Patient underwent will undergo cardiac catheterization Lasix later today and 

overnight events








10/17/2019


Patient had a repeat cardiac catheterization yesterday and had stenting to LAD 

patient received 2 stents to LAD.  Patient is newly diagnosed diabetic patient 

will be discharged on metformin.  Patient was asked to check his blood sugars 

twice a day and take the readings to PCPs office.  Patient's blood sugars remain

B type.  Patient is on insulin low dose here and patient was not initiated on m

etformin because of cardiac catheterization.  Patient received stents to RCA and

now to LAD














PHYSICAL EXAMINATION: 





GENERAL: The patient is alert and oriented x3, not in any acute distress. Well d

eveloped, well nourished. 


HEENT: Pupils are round and equally reacting to light. EOMI. No scleral icterus.

No conjunctival pallor. Normocephalic, atraumatic. No pharyngeal erythema. No 

thyromegaly. 


CARDIOVASCULAR: S1 and S2 present. No murmurs, rubs, or gallops. 


PULMONARY: Chest is clear to auscultation, no wheezing or crackles. 


ABDOMEN: Soft, nontender, nondistended, normoactive bowel sounds. No palpable 

organomegaly. 


MUSCULOSKELETAL: No joint swelling or deformity.


EXTREMITIES: No cyanosis, clubbing, or pedal edema. 


NEUROLOGICAL: Gross neurological examination did not reveal any focal deficits. 


SKIN: No rashes. 








Assessment and Plan


Plan: 


-ST elevation microinfarction: Status post witnessed cardiac catheterization and

stenting of RCA staged intervention to LAD most probably today.  Continue with 

dual antiplatelet therapy statin, beta blocker 


-type 2 diabetes mellitus poorly well-controlled patient will be discharged on 

metformin


-Nicotine dependence: Counseling was provided


-Hypertension


-Ischemic cardiomyopathy systolic dysfunction which is acute patient is not in 

acute exacerbation of heart failure.  Hopefully his ejection fraction will 

improve down the line.  Patient is being discharged on ACE inhibitor.





Patient Condition at Discharge: Critical





Plan - Discharge Summary


Discharge Rx Participant: Yes


New Discharge Prescriptions: 


New


   Spironolactone [Aldactone] 25 mg PO DAILY #90 tablet


   Aspirin 81 mg PO DAILY #90 chewable


   Carvedilol [Coreg] 3.125 mg PO BID #180 tablet


   Atorvastatin [Lipitor] 80 mg PO DAILY #90 tab


   Clopidogrel Bisulfate [Plavix] 75 mg PO DAILY #90 tab


   Lisinopril [Zestril] 5 mg PO DAILY #90 tab


   metFORMIN HCL [Glucophage] 850 mg PO BID #60 tab


Discharge Medication List





Aspirin 81 mg PO DAILY #90 chewable 10/15/19 [Rx]


Atorvastatin [Lipitor] 80 mg PO DAILY #90 tab 10/15/19 [Rx]


Carvedilol [Coreg] 3.125 mg PO BID #180 tablet 10/15/19 [Rx]


Clopidogrel Bisulfate [Plavix] 75 mg PO DAILY #90 tab 10/15/19 [Rx]


Lisinopril [Zestril] 5 mg PO DAILY #90 tab 10/15/19 [Rx]


Spironolactone [Aldactone] 25 mg PO DAILY #90 tablet 10/15/19 [Rx]


metFORMIN HCL [Glucophage] 850 mg PO BID #60 tab 10/17/19 [Rx]








Follow up Appointment(s)/Referral(s): 


Juanjose Powell MD [STAFF PHYSICIAN] - 10/22/19 2:30 pm (Tuesday)


None,Stated [Primary Care Provider] - 1-2 days


Patient Instructions/Handouts:  Heart Attack (DC), Heart Healthy Diet (DC), 

Coronary Intravascular Stent Placement (DC)


Discharge Disposition: HOME SELF-CARE No